# Patient Record
Sex: FEMALE | Race: WHITE | Employment: FULL TIME | URBAN - METROPOLITAN AREA
[De-identification: names, ages, dates, MRNs, and addresses within clinical notes are randomized per-mention and may not be internally consistent; named-entity substitution may affect disease eponyms.]

---

## 2017-01-20 ENCOUNTER — TELEPHONE (OUTPATIENT)
Dept: PRIMARY CARE CLINIC | Age: 47
End: 2017-01-20

## 2017-01-23 PROBLEM — Z51.11 ENCOUNTER FOR CHEMOTHERAPY MANAGEMENT: Status: ACTIVE | Noted: 2017-01-23

## 2017-03-14 PROBLEM — I89.0 LYMPHEDEMA OF BREAST: Status: ACTIVE | Noted: 2017-03-14

## 2017-03-23 ENCOUNTER — CARE COORDINATION (OUTPATIENT)
Dept: CARE COORDINATION | Age: 47
End: 2017-03-23

## 2017-03-29 ENCOUNTER — HOSPITAL ENCOUNTER (OUTPATIENT)
Dept: PHYSICAL THERAPY | Age: 47
Discharge: HOME | End: 2017-03-31
Attending: NURSE PRACTITIONER | Admitting: NURSE PRACTITIONER

## 2017-03-31 ENCOUNTER — OFFICE VISIT (OUTPATIENT)
Dept: SURGERY | Age: 47
End: 2017-03-31

## 2017-03-31 VITALS
WEIGHT: 244 LBS | SYSTOLIC BLOOD PRESSURE: 129 MMHG | DIASTOLIC BLOOD PRESSURE: 72 MMHG | BODY MASS INDEX: 39.21 KG/M2 | HEIGHT: 66 IN | HEART RATE: 60 BPM

## 2017-03-31 DIAGNOSIS — Z90.12 HISTORY OF PARTIAL MASTECTOMY, LEFT: ICD-10-CM

## 2017-03-31 DIAGNOSIS — Z92.3 HISTORY OF RADIATION THERAPY: ICD-10-CM

## 2017-03-31 DIAGNOSIS — Z08 ENCOUNTER FOR FOLLOW-UP SURVEILLANCE OF BREAST CANCER: ICD-10-CM

## 2017-03-31 DIAGNOSIS — Z85.3 ENCOUNTER FOR FOLLOW-UP SURVEILLANCE OF BREAST CANCER: ICD-10-CM

## 2017-03-31 DIAGNOSIS — Z92.21 HISTORY OF CHEMOTHERAPY: ICD-10-CM

## 2017-03-31 DIAGNOSIS — Z85.3 PERSONAL HISTORY OF BREAST CANCER: Primary | ICD-10-CM

## 2017-03-31 DIAGNOSIS — Z98.890 HISTORY OF BILATERAL BREAST REDUCTION SURGERY: ICD-10-CM

## 2017-03-31 PROCEDURE — 99213 OFFICE O/P EST LOW 20 MIN: CPT | Performed by: SURGERY

## 2017-04-28 ENCOUNTER — OFFICE VISIT (OUTPATIENT)
Dept: PRIMARY CARE CLINIC | Age: 47
End: 2017-04-28

## 2017-04-28 VITALS
SYSTOLIC BLOOD PRESSURE: 120 MMHG | WEIGHT: 247 LBS | BODY MASS INDEX: 39.7 KG/M2 | HEIGHT: 66 IN | DIASTOLIC BLOOD PRESSURE: 68 MMHG

## 2017-04-28 DIAGNOSIS — E55.9 VITAMIN D DEFICIENCY: ICD-10-CM

## 2017-04-28 DIAGNOSIS — Z00.00 ROUTINE PHYSICAL EXAMINATION: Primary | ICD-10-CM

## 2017-04-28 LAB
CHOLESTEROL, TOTAL: 267 MG/DL (ref 0–199)
HDLC SERPL-MCNC: 75 MG/DL (ref 40–60)
LDL CHOLESTEROL CALCULATED: 179 MG/DL
TRIGL SERPL-MCNC: 66 MG/DL (ref 0–150)
VITAMIN D 25-HYDROXY: 36.9 NG/ML
VLDLC SERPL CALC-MCNC: 13 MG/DL

## 2017-04-28 PROCEDURE — 99396 PREV VISIT EST AGE 40-64: CPT | Performed by: INTERNAL MEDICINE

## 2017-04-29 LAB
ESTIMATED AVERAGE GLUCOSE: 111.2 MG/DL
HBA1C MFR BLD: 5.5 %

## 2017-04-30 ENCOUNTER — TELEPHONE (OUTPATIENT)
Dept: PRIMARY CARE CLINIC | Age: 47
End: 2017-04-30

## 2017-05-01 ENCOUNTER — TELEPHONE (OUTPATIENT)
Dept: PRIMARY CARE CLINIC | Age: 47
End: 2017-05-01

## 2017-05-01 RX ORDER — ERGOCALCIFEROL (VITAMIN D2) 1250 MCG
CAPSULE ORAL
Qty: 24 CAPSULE | Refills: 3 | Status: SHIPPED | OUTPATIENT
Start: 2017-05-01 | End: 2018-05-31 | Stop reason: SDUPTHER

## 2017-05-01 RX ORDER — ATORVASTATIN CALCIUM 40 MG/1
40 TABLET, FILM COATED ORAL DAILY
Qty: 90 TABLET | Refills: 3 | Status: SHIPPED | OUTPATIENT
Start: 2017-05-01 | End: 2017-09-25

## 2017-06-22 ENCOUNTER — OFFICE VISIT (OUTPATIENT)
Dept: URGENT CARE | Age: 47
End: 2017-06-22

## 2017-06-22 VITALS
WEIGHT: 253 LBS | HEART RATE: 65 BPM | HEIGHT: 66 IN | TEMPERATURE: 96.7 F | BODY MASS INDEX: 40.66 KG/M2 | DIASTOLIC BLOOD PRESSURE: 80 MMHG | OXYGEN SATURATION: 98 % | SYSTOLIC BLOOD PRESSURE: 139 MMHG

## 2017-06-22 DIAGNOSIS — H93.8X3 EAR FULLNESS, BILATERAL: ICD-10-CM

## 2017-06-22 DIAGNOSIS — H65.191 ACUTE EFFUSION OF RIGHT EAR: ICD-10-CM

## 2017-06-22 DIAGNOSIS — H61.22 IMPACTED CERUMEN, LEFT EAR: Primary | ICD-10-CM

## 2017-06-22 PROCEDURE — 99203 OFFICE O/P NEW LOW 30 MIN: CPT | Performed by: EMERGENCY MEDICINE

## 2017-06-22 ASSESSMENT — ENCOUNTER SYMPTOMS
COUGH: 0
SHORTNESS OF BREATH: 0
EYE PAIN: 0
ABDOMINAL PAIN: 0
EYE DISCHARGE: 0
NAUSEA: 0
VOMITING: 0
SORE THROAT: 0
TROUBLE SWALLOWING: 0
RHINORRHEA: 0

## 2017-07-05 ENCOUNTER — OFFICE VISIT (OUTPATIENT)
Dept: SURGERY | Age: 47
End: 2017-07-05

## 2017-07-05 ENCOUNTER — HOSPITAL ENCOUNTER (OUTPATIENT)
Dept: MAMMOGRAPHY | Age: 47
Discharge: HOME | End: 2017-07-05
Attending: SURGERY | Admitting: SURGERY

## 2017-07-05 VITALS
HEART RATE: 59 BPM | HEIGHT: 66 IN | DIASTOLIC BLOOD PRESSURE: 74 MMHG | BODY MASS INDEX: 39.37 KG/M2 | SYSTOLIC BLOOD PRESSURE: 126 MMHG | WEIGHT: 245 LBS

## 2017-07-05 DIAGNOSIS — Z85.3 PERSONAL HISTORY OF BREAST CANCER: ICD-10-CM

## 2017-07-05 DIAGNOSIS — Z85.3 ENCOUNTER FOR FOLLOW-UP SURVEILLANCE OF BREAST CANCER: ICD-10-CM

## 2017-07-05 DIAGNOSIS — Z90.12 STATUS POST PARTIAL MASTECTOMY, LEFT: ICD-10-CM

## 2017-07-05 DIAGNOSIS — C50.212 MALIGNANT NEOPLASM OF UPPER-INNER QUADRANT OF LEFT FEMALE BREAST (HCC): Primary | ICD-10-CM

## 2017-07-05 DIAGNOSIS — Z92.21 HISTORY OF CHEMOTHERAPY: ICD-10-CM

## 2017-07-05 DIAGNOSIS — Z08 ENCOUNTER FOR FOLLOW-UP SURVEILLANCE OF BREAST CANCER: ICD-10-CM

## 2017-07-05 DIAGNOSIS — Z92.3 HISTORY OF RADIATION THERAPY: ICD-10-CM

## 2017-07-05 DIAGNOSIS — Z90.12 HISTORY OF PARTIAL MASTECTOMY, LEFT: ICD-10-CM

## 2017-07-05 DIAGNOSIS — C50.919 TRIPLE NEGATIVE MALIGNANT NEOPLASM OF BREAST (HCC): ICD-10-CM

## 2017-07-05 DIAGNOSIS — I89.0 LYMPHEDEMA OF BREAST: ICD-10-CM

## 2017-07-05 DIAGNOSIS — Z98.890 HISTORY OF BILATERAL BREAST REDUCTION SURGERY: ICD-10-CM

## 2017-07-05 PROCEDURE — 99213 OFFICE O/P EST LOW 20 MIN: CPT | Performed by: SURGERY

## 2017-07-31 ENCOUNTER — CARE COORDINATION (OUTPATIENT)
Dept: CARE COORDINATION | Age: 47
End: 2017-07-31

## 2017-08-03 ENCOUNTER — OFFICE VISIT (OUTPATIENT)
Dept: PRIMARY CARE CLINIC | Age: 47
End: 2017-08-03

## 2017-08-03 VITALS
BODY MASS INDEX: 40.53 KG/M2 | SYSTOLIC BLOOD PRESSURE: 118 MMHG | WEIGHT: 252.2 LBS | HEIGHT: 66 IN | DIASTOLIC BLOOD PRESSURE: 72 MMHG

## 2017-08-03 DIAGNOSIS — E78.00 HYPERCHOLESTEROLEMIA: Primary | ICD-10-CM

## 2017-08-03 DIAGNOSIS — T73.2XXA FATIGUE DUE TO EXPOSURE, INITIAL ENCOUNTER: ICD-10-CM

## 2017-08-03 DIAGNOSIS — M79.10 MUSCLE PAIN: ICD-10-CM

## 2017-08-03 PROCEDURE — 99214 OFFICE O/P EST MOD 30 MIN: CPT | Performed by: INTERNAL MEDICINE

## 2017-08-04 LAB
CHOLESTEROL, TOTAL: 203 MG/DL (ref 0–199)
HDLC SERPL-MCNC: 59 MG/DL (ref 40–60)
LDL CHOLESTEROL CALCULATED: 126 MG/DL
TOTAL CK: 77 U/L (ref 26–192)
TRIGL SERPL-MCNC: 92 MG/DL (ref 0–150)
TSH SERPL DL<=0.05 MIU/L-ACNC: 3.59 UIU/ML (ref 0.27–4.2)
VLDLC SERPL CALC-MCNC: 18 MG/DL

## 2017-09-25 ENCOUNTER — HOSPITAL ENCOUNTER (OUTPATIENT)
Dept: GENERAL RADIOLOGY | Facility: MEDICAL CENTER | Age: 47
Discharge: HOME | End: 2017-09-25
Attending: INTERNAL MEDICINE | Admitting: INTERNAL MEDICINE

## 2017-09-25 ENCOUNTER — OFFICE VISIT (OUTPATIENT)
Dept: PRIMARY CARE CLINIC | Age: 47
End: 2017-09-25

## 2017-09-25 VITALS
BODY MASS INDEX: 40.82 KG/M2 | WEIGHT: 254 LBS | DIASTOLIC BLOOD PRESSURE: 68 MMHG | HEIGHT: 66 IN | SYSTOLIC BLOOD PRESSURE: 118 MMHG

## 2017-09-25 DIAGNOSIS — M25.561 CHRONIC PAIN OF BOTH KNEES: Primary | ICD-10-CM

## 2017-09-25 DIAGNOSIS — M25.562 CHRONIC PAIN OF BOTH KNEES: ICD-10-CM

## 2017-09-25 DIAGNOSIS — G89.29 CHRONIC PAIN OF BOTH KNEES: ICD-10-CM

## 2017-09-25 DIAGNOSIS — G89.29 CHRONIC PAIN OF BOTH KNEES: Primary | ICD-10-CM

## 2017-09-25 DIAGNOSIS — Z23 FLU VACCINE NEED: ICD-10-CM

## 2017-09-25 DIAGNOSIS — M25.562 CHRONIC PAIN OF BOTH KNEES: Primary | ICD-10-CM

## 2017-09-25 DIAGNOSIS — M25.561 CHRONIC PAIN OF BOTH KNEES: ICD-10-CM

## 2017-09-25 PROCEDURE — 99214 OFFICE O/P EST MOD 30 MIN: CPT | Performed by: INTERNAL MEDICINE

## 2017-09-25 PROCEDURE — 90471 IMMUNIZATION ADMIN: CPT | Performed by: INTERNAL MEDICINE

## 2017-09-25 PROCEDURE — 90686 IIV4 VACC NO PRSV 0.5 ML IM: CPT | Performed by: INTERNAL MEDICINE

## 2017-09-26 PROBLEM — M17.0 ARTHRITIS OF BOTH KNEES: Status: ACTIVE | Noted: 2017-09-26

## 2017-10-23 ENCOUNTER — OFFICE VISIT (OUTPATIENT)
Dept: URGENT CARE | Age: 47
End: 2017-10-23

## 2017-10-23 VITALS
SYSTOLIC BLOOD PRESSURE: 122 MMHG | DIASTOLIC BLOOD PRESSURE: 67 MMHG | OXYGEN SATURATION: 97 % | WEIGHT: 257.6 LBS | HEART RATE: 67 BPM | RESPIRATION RATE: 14 BRPM | HEIGHT: 66 IN | TEMPERATURE: 97.7 F | BODY MASS INDEX: 41.4 KG/M2

## 2017-10-23 DIAGNOSIS — R10.32 ABDOMINAL PAIN, LLQ: Primary | ICD-10-CM

## 2017-10-23 PROCEDURE — 99212 OFFICE O/P EST SF 10 MIN: CPT | Performed by: PHYSICIAN ASSISTANT

## 2017-10-24 ENCOUNTER — OFFICE VISIT (OUTPATIENT)
Dept: PRIMARY CARE CLINIC | Age: 47
End: 2017-10-24

## 2017-10-24 ENCOUNTER — HOSPITAL ENCOUNTER (OUTPATIENT)
Dept: GENERAL RADIOLOGY | Facility: MEDICAL CENTER | Age: 47
Discharge: HOME | End: 2017-10-24
Attending: INTERNAL MEDICINE | Admitting: INTERNAL MEDICINE

## 2017-10-24 VITALS
SYSTOLIC BLOOD PRESSURE: 112 MMHG | BODY MASS INDEX: 41.3 KG/M2 | HEIGHT: 66 IN | WEIGHT: 257 LBS | DIASTOLIC BLOOD PRESSURE: 78 MMHG | TEMPERATURE: 98.4 F

## 2017-10-24 DIAGNOSIS — R10.32 LLQ PAIN: ICD-10-CM

## 2017-10-24 DIAGNOSIS — R10.32 LLQ PAIN: Primary | ICD-10-CM

## 2017-10-24 PROCEDURE — 99214 OFFICE O/P EST MOD 30 MIN: CPT | Performed by: INTERNAL MEDICINE

## 2017-10-24 ASSESSMENT — ENCOUNTER SYMPTOMS
NAUSEA: 0
BACK PAIN: 0
CONSTIPATION: 0
DIARRHEA: 0
BLOOD IN STOOL: 0
VOMITING: 0
ABDOMINAL PAIN: 1

## 2017-10-24 NOTE — PROGRESS NOTES
Allergies   Allergen Reactions    Adhesive Tape      Irritates skin    Percocet [Oxycodone-Acetaminophen] Itching and Nausea And Vomiting    Prozac [Fluoxetine] Nausea And Vomiting       Review of Systems     ROS: Please refer to HPI for any pertinent positive findings, as all other systems were reviewed as negative unless otherwise listed above. Review of Systems   Constitutional: Negative for chills, diaphoresis, fever, malaise/fatigue and weight loss. Gastrointestinal: Positive for abdominal pain. Negative for blood in stool, constipation, diarrhea, nausea and vomiting. Genitourinary: Negative for dysuria, flank pain, frequency, hematuria and urgency. Musculoskeletal: Negative for back pain, falls, joint pain and myalgias. Skin: Negative for rash. Physical Exam     Vitals:    10/23/17 1234   BP: 122/67   Pulse: 67   Resp: 14   Temp: 97.2531118802170 °F (36.5 °C)   SpO2: 97%       Constitutional:  Well developed, well nourished, no acute distress, non-toxic appearance   Eyes:  PERRL, conjunctiva normal, no ciliary injection, evidence of foreign body, or discharge. HENT:  Head is normocephalic, atraumatic; external ears normal, external nose and nasal mucosa normal, oropharynx pink and moist, no pharyngeal exudates. Neck- Supple, passive and active range of motion in tact, no tenderness upon palpation along spine. No LAD  or neck masses appreciated. Respiratory:  No respiratory distress, normal breath sounds bilaterally, no rales, no wheezing. Cardiovascular:  Normal rate, normal rhythm, no murmurs, no gallops, no rubs   GI:  Some tenderness to palpation at LLQ. Abdomen otherwise soft, nondistended, normal bowel sounds, no organomegaly, no mass, no rebound, no guarding. : No pain with palpation at lower back, no flank pain with bilateral CVA bump. No bladder pressure with palpation. Musculoskeletal:  No pain upon palpation along spine or musculature.  No edema, no tenderness, no deformities. Integument:  Well hydrated, no lesions, cyanosis, or rashes appreciated. Lymphatic:  No lymphadenopathy noted   Neurologic:  Alert & oriented x 3, CN 2-12 in tact bilaterally, normal motor function and sensation in tact, no focal deficits noted   Psychiatric:  Speech and behavior appropriate. Appropriate mood and affect. Physical Exam    Procedure:   none    Assessment:    1. Abdominal pain, LLQ        Plan:    - We have discussed cause of sxs are not likely d/t UTI, diverticulosis, or muscle strain. Consider possible ovarian cysts, hormonal changes, or perimenopause/menopausal sxs (early onset d/t chemotherapy for breast cancer). We have discussed sxs of diverticulitis, ovarian torsion and other emergencies and when to go to the ER. Otherwise, encouraged patient to call oncologist to notify her of changes and follow up with her (or PCP) in the next 1-2 weeks to see if this is an expected change given her chemoradiation treatment, age, and hx of cystic ovaries. No orders of the defined types were placed in this encounter.

## 2017-10-24 NOTE — PROGRESS NOTES
auscultation bilaterally  Heart: regular rate and rhythm, S1, S2 normal, no murmur, click, rub or gallop  Abdomen: soft. No masses. Normal BS. LLQ tenderness  Neurologic: Mental status: Alert, oriented, thought content appropriate  Cranial nerves: normal  Skin: Skin is warm and dry. Asad Basket Psychiatric: normal mood and affect. speech is normal and behavior is normal. Judgment, cognition and memory are normal.     not applicable    Assessment and Plan  1.  LLQ pain  Patient already with neuropathy from recent chemo-would be concerned about diverticulitis, but has not had in past and want to avoid flagyl unless absolutely necessary with preexisting neuropathy  Discussed ovarian problem would also likely be visible on CT    - CT ABDOMEN PELVIS W WO IV CONTRAST Additional Contrast? Oral

## 2017-10-31 ENCOUNTER — TELEPHONE (OUTPATIENT)
Dept: BARIATRICS/WEIGHT MGMT | Age: 47
End: 2017-10-31

## 2017-10-31 NOTE — TELEPHONE ENCOUNTER
Called as a courtesy call - reminder - had to leave a voicemail. Reminded patient to arrive 30 min before appt time with their   complete new patient paperwork, insurance card, and ID. If they had questions left our   office number to call. For some reason if they didn't receive paperwork   let us know and we could email or they could print off website. Let them   know if this wasn't complete and if didn't arrive on time they may be rescheduled.

## 2017-11-14 ENCOUNTER — OFFICE VISIT (OUTPATIENT)
Dept: BARIATRICS/WEIGHT MGMT | Age: 47
End: 2017-11-14

## 2017-11-14 VITALS
SYSTOLIC BLOOD PRESSURE: 118 MMHG | WEIGHT: 258.4 LBS | DIASTOLIC BLOOD PRESSURE: 88 MMHG | RESPIRATION RATE: 16 BRPM | HEIGHT: 66 IN | HEART RATE: 88 BPM | BODY MASS INDEX: 41.53 KG/M2

## 2017-11-14 DIAGNOSIS — C50.919 TRIPLE NEGATIVE MALIGNANT NEOPLASM OF BREAST (HCC): ICD-10-CM

## 2017-11-14 DIAGNOSIS — K21.9 CHRONIC GERD: ICD-10-CM

## 2017-11-14 DIAGNOSIS — E78.00 HYPERCHOLESTEROLEMIA: ICD-10-CM

## 2017-11-14 DIAGNOSIS — Z01.818 PRE-OPERATIVE CLEARANCE: ICD-10-CM

## 2017-11-14 PROBLEM — E78.2 MIXED HYPERLIPIDEMIA: Status: ACTIVE | Noted: 2017-11-14

## 2017-11-14 PROCEDURE — 99205 OFFICE O/P NEW HI 60 MIN: CPT | Performed by: SURGERY

## 2017-11-14 NOTE — PROGRESS NOTES
patient to feel comfortably full. Most days the patient eats until she is stuffed. Patient describes level of activity as mild - water aerobic 2X/week. Surgery  Patient's greatest concern about having surgery is: surgery itself. Patient describes the motivation for weight loss surgery to be: Wants long term weight control, generally feel better, reduce risk or reoccurrence of breast cancer. Patient does feel confident in her ability to make these changes. The patient's expectations of post-surgical eating habits realistic. Patient states she does understand the consequences of not complying with post-op food guidelines. Patient states she understands the long term changes in food intake that will be necessary for all occasions after surgery for the rest of her life. she does understand the long term food changes. Patient is deemed nutritionally appropriate to proceed. Goals  Weight: 160 lbs  Health Improvement: lower cholesterol, prevent heart disease    Assessment  Nutritional Needs: RMR=(9.99 x 117.2) + (6.25 x 167.6) - (4.92 x 47 y.o.) -161  = 1817 kcal x 1.4 (sedentary activity factor)= 2544 kcal - 1000 (for 2 lb weight loss/week)= 1544 kcal.    Plan  Surgical procedure desired: Sleeve    Plan/Recommendations: General weight loss/lifestyle modification strategies discussed (elicit support from others; identify saboteurs; non-food rewards, etc). Informal exercise measures discussed, e.g. taking stairs instead of elevator. Goals:   -Eat 4-5 times daily  -Avoid high fat and high sugar foods  -Include protein with all meals and snacks  -Avoid carbonation and caffeine  -Avoid calorie containing beverages  -Increase physical activity as tolerated    PES Statement:  Overweight/Obesity related to lack of exercise, sedentary lifestyle, unhealthy eating habits, and unsuccessful diet attempts as evidenced by BMI. Body mass index is 41.71 kg/m². .    Will follow up as necessary.     [employer] Jake

## 2017-11-14 NOTE — PATIENT INSTRUCTIONS
Patient received dietary handouts and education. Labs ordered. Psych Evaluation. EGD  Support Group. PCP Letter. Quit Smoking and Alcohol   Weight History 2 yrs. F/U in 4 weeks           Patient advised that its their responsibility to follow up for studies, referrals and/or labs ordered today.

## 2017-11-15 LAB
A/G RATIO: 1.6 (ref 1.1–2.2)
ALBUMIN SERPL-MCNC: 4.3 G/DL (ref 3.4–5)
ALP BLD-CCNC: 114 U/L (ref 40–129)
ALT SERPL-CCNC: 21 U/L (ref 10–40)
ANION GAP SERPL CALCULATED.3IONS-SCNC: 13 MMOL/L (ref 3–16)
AST SERPL-CCNC: 19 U/L (ref 15–37)
BASOPHILS ABSOLUTE: 0 K/UL (ref 0–0.2)
BASOPHILS RELATIVE PERCENT: 1.1 %
BILIRUB SERPL-MCNC: 0.4 MG/DL (ref 0–1)
BUN BLDV-MCNC: 18 MG/DL (ref 7–20)
CALCIUM SERPL-MCNC: 9.7 MG/DL (ref 8.3–10.6)
CHLORIDE BLD-SCNC: 101 MMOL/L (ref 99–110)
CHOLESTEROL, TOTAL: 281 MG/DL (ref 0–199)
CO2: 30 MMOL/L (ref 21–32)
CREAT SERPL-MCNC: 0.6 MG/DL (ref 0.6–1.1)
EOSINOPHILS ABSOLUTE: 0.1 K/UL (ref 0–0.6)
EOSINOPHILS RELATIVE PERCENT: 1.5 %
FOLATE: 16.77 NG/ML (ref 4.78–24.2)
GFR AFRICAN AMERICAN: >60
GFR NON-AFRICAN AMERICAN: >60
GLOBULIN: 2.7 G/DL
GLUCOSE BLD-MCNC: 79 MG/DL (ref 70–99)
HCT VFR BLD CALC: 38.5 % (ref 36–48)
HDLC SERPL-MCNC: 67 MG/DL (ref 40–60)
HEMOGLOBIN: 13.1 G/DL (ref 12–16)
IRON SATURATION: 26 % (ref 15–50)
IRON: 82 UG/DL (ref 37–145)
LDL CHOLESTEROL CALCULATED: 192 MG/DL
LYMPHOCYTES ABSOLUTE: 0.8 K/UL (ref 1–5.1)
LYMPHOCYTES RELATIVE PERCENT: 21.8 %
MCH RBC QN AUTO: 32.1 PG (ref 26–34)
MCHC RBC AUTO-ENTMCNC: 34.1 G/DL (ref 31–36)
MCV RBC AUTO: 93.9 FL (ref 80–100)
MONOCYTES ABSOLUTE: 0.3 K/UL (ref 0–1.3)
MONOCYTES RELATIVE PERCENT: 8.3 %
NEUTROPHILS ABSOLUTE: 2.5 K/UL (ref 1.7–7.7)
NEUTROPHILS RELATIVE PERCENT: 67.3 %
PDW BLD-RTO: 13 % (ref 12.4–15.4)
PLATELET # BLD: 181 K/UL (ref 135–450)
PMV BLD AUTO: 9.2 FL (ref 5–10.5)
POTASSIUM SERPL-SCNC: 4.1 MMOL/L (ref 3.5–5.1)
RBC # BLD: 4.09 M/UL (ref 4–5.2)
SODIUM BLD-SCNC: 144 MMOL/L (ref 136–145)
TOTAL IRON BINDING CAPACITY: 310 UG/DL (ref 260–445)
TOTAL PROTEIN: 7 G/DL (ref 6.4–8.2)
TRIGL SERPL-MCNC: 111 MG/DL (ref 0–150)
TSH REFLEX: 3.31 UIU/ML (ref 0.27–4.2)
VITAMIN B-12: 453 PG/ML (ref 211–911)
VITAMIN D 25-HYDROXY: 45.9 NG/ML
VLDLC SERPL CALC-MCNC: 22 MG/DL
WBC # BLD: 3.7 K/UL (ref 4–11)

## 2017-11-16 LAB
ESTIMATED AVERAGE GLUCOSE: 108.3 MG/DL
HBA1C MFR BLD: 5.4 %

## 2017-11-18 NOTE — PROGRESS NOTES
Procedure Laterality Date    BREAST BIOPSY      CHOLECYSTECTOMY  1994    HYSTERECTOMY  2004    HYSTERECTOMY, VAGINAL      LASIK      OTHER SURGICAL HISTORY Right 05/12/2016    right subclavian port-a-cath    ROTATOR CUFF REPAIR Right 2012     Family History   Problem Relation Age of Onset    Heart Disease Mother     Arthritis Mother     Asthma Mother     Coronary Art Dis Mother     Heart Disease Father     Arthritis Father     Diabetes Father     Coronary Art Dis Father     Depression Father     Hypertension Father     Cancer Sister      Social History   Substance Use Topics    Smoking status: Never Smoker    Smokeless tobacco: Never Used    Alcohol use No      Comment: rarely     I counseled the patient on the importance of not smoking and risks of ETOH. Allergies   Allergen Reactions    Adhesive Tape      Irritates skin    Percocet [Oxycodone-Acetaminophen] Itching and Nausea And Vomiting    Prozac [Fluoxetine] Nausea And Vomiting     Vitals:    11/14/17 1508   BP: 118/88   Pulse: 88   Resp: 16   Weight: 258 lb 6.4 oz (117.2 kg)   Height: 5' 6\" (1.676 m)       Body mass index is 41.71 kg/m².       Current Outpatient Prescriptions:     ergocalciferol (DRISDOL) 42635 UNITS capsule, 1 cap po twice weekly, Disp: 24 capsule, Rfl: 3    melatonin 3 MG TABS tablet, Take 10 mg by mouth daily, Disp: , Rfl:     citalopram (CELEXA) 20 MG tablet, 1 tab po daily for anxiety, Disp: 90 tablet, Rfl: 1    B Complex Vitamins (VITAMIN B COMPLEX PO), Take by mouth, Disp: , Rfl:     ranitidine (ZANTAC) 300 MG tablet, Take 300 mg by mouth nightly, Disp: , Rfl:     fluticasone (FLONASE) 50 MCG/ACT nasal spray, 2 sprays each nostril daily, Disp: 3 Bottle, Rfl: 3    diphenhydrAMINE (BENADRYL) 25 MG capsule, Take 25 mg by mouth every 6 hours as needed for Itching, Disp: , Rfl:     SUMAtriptan (IMITREX) 100 MG tablet, Take 1 tablet by mouth once as needed for Migraine, Disp: 27 tablet, Rfl: 1    nystatin no rales. Patient exhibits no tenderness and no crepitus. Abdominal: Soft. Normal appearance and bowel sounds are normal. Patient exhibits no distension, no abdominal bruit, no ascites and no mass. There is no hepatosplenomegaly. There is no tenderness. There is no rigidity, no rebound, no guarding and no CVA tenderness. No hernia. Hernia confirmed negative in the ventral area. Musculoskeletal: Normal range of motion. Patient exhibits no edema or tenderness. Lymphadenopathy:        Head (right side): No submental, no submandibular, no preauricular, no posterior auricular and no occipital adenopathy present. Head (left side): No submental, no submandibular, no preauricular, no posterior auricular and no occipital adenopathy present. Patient  has no cervical adenopathy. Right: No supraclavicular adenopathy present. Left: No supraclavicular adenopathy present. Neurological: Patient is alert and oriented to person, place, and time. Patient has normal strength. Coordination and gait normal. GCS eye subscore is 4. GCS verbal subscore is 5. GCS motor subscore is 6. Skin: Skin is warm and dry. No abrasion and no rash noted. Patient  is not diaphoretic. No cyanosis or erythema. Psychiatric: Patient has a normal mood and affect. speech is normal and behavior is normal. Cognition and memory are normal.         Nguyen was seen today for bariatric, initial visit. Diagnoses and all orders for this visit:    Obesity, Class II, BMI 35-39.9, with comorbidity  -     CBC with Differential; Future  -     Lipid Panel; Future  -     Comprehensive Metabolic Panel; Future  -     Iron and TIBC; Future  -     TSH with Reflex; Future  -     Vitamin B12 & Folate; Future  -     Vitamin D 25 Hydroxy; Future  -     Hemoglobin A1C; Future    Pre-operative clearance  -     CBC with Differential; Future  -     Lipid Panel; Future  -     Comprehensive Metabolic Panel; Future  -     Iron and TIBC;  Future  - TSH with Reflex; Future  -     Vitamin B12 & Folate; Future  -     Vitamin D 25 Hydroxy; Future  -     Hemoglobin A1C; Future    Triple negative malignant neoplasm of breast (HCC)  -     CBC with Differential; Future  -     Lipid Panel; Future  -     Comprehensive Metabolic Panel; Future  -     Iron and TIBC; Future  -     TSH with Reflex; Future  -     Vitamin B12 & Folate; Future  -     Vitamin D 25 Hydroxy; Future  -     Hemoglobin A1C; Future    Hypercholesterolemia-ASCVD 1.1%  3/2016  -     CBC with Differential; Future  -     Lipid Panel; Future  -     Comprehensive Metabolic Panel; Future  -     Iron and TIBC; Future  -     TSH with Reflex; Future  -     Vitamin B12 & Folate; Future  -     Vitamin D 25 Hydroxy; Future  -     Hemoglobin A1C; Future    Chronic GERD  -     CBC with Differential; Future  -     Lipid Panel; Future  -     Comprehensive Metabolic Panel; Future  -     Iron and TIBC; Future  -     TSH with Reflex; Future  -     Vitamin B12 & Folate; Future  -     Vitamin D 25 Hydroxy; Future  -     Hemoglobin A1C; Future    Other orders  -     CBC Auto Differential  -     Lipid Panel  -     Iron and TIBC  -     Comprehensive Metabolic Panel  -     TSH with Reflex  -     Vitamin D 25 Hydroxy  -     Vitamin B12 & Folate  -     Hemoglobin A1C          A/P  Arianna Carroll is a very pleasant 50 y.o. female with Obesity,  Body mass index is 41.71 kg/m². and multiple obesity related co-morbidities. Arianna Carroll is very motivated to lose weight and being more healthy.    We discussed how her weight affects her overall health including:  Patient Active Problem List   Diagnosis    Allergic rhinitis    Chronic migraine    Hypercholesterolemia-ASCVD 1.1%  3/2016    Malignant neoplasm of upper-inner quadrant of left female breast ([address]    Triple negative malignant neoplasm of breast ([address]    Neoplastic malignant related fatigue    Chemotherapy-induced neuropathy ([address]    Malignant [facility] of left female [facility]    Symptomatic mammary hypertrophy    Encounter for chemotherapy management    Lymphedema of breast    Obesity, Class II, BMI 35-39.9, with comorbidity    Arthritis of both knees-xray 2017    Pre-operative clearance    Chronic GERD      The patient underwent 30 minutes of dietary counseling. I have reviewed, discussed and agree with the dietary plan. Medical weight loss and different surgical options were discussed in details with patient. Rommel Avery is interested in surgical weight loss. Patient is interested in Laparoscopic Sleeve Gastrectomy , which I believe is an excellent option. We will proceed with pre-operative work up labs and studies. Will also petition patient's  insurance for approval for this procedure. I advised the patient that we can't guarantee final insurance approval.    Patient received dietary handouts and education. Patient advised that its their responsibility to follow up for studies, referrals and/or labs ordered today. Also discussed in details the importance of follow up, as well following the recommendations and completing the whole program to improve outcomes when it comes to healthier lifestyle as well weight loss. Patient also advised about risks and benefits being on a strict dietary regimen as well using supplements. Patient agrees and wants to proceed with weight loss planning       Patient Instructions   Patient received dietary handouts and education. Labs ordered. Psych Evaluation. EGD  Support Group. PCP Letter. Quit Smoking and Alcohol   Weight History 2 yrs. F/U in 4 weeks           Patient advised that its their responsibility to follow up for studies, referrals and/or labs ordered today.

## 2017-11-20 ENCOUNTER — TELEPHONE (OUTPATIENT)
Dept: BARIATRICS/WEIGHT MGMT | Age: 47
End: 2017-11-20

## 2017-11-20 DIAGNOSIS — E78.2 MIXED HYPERLIPIDEMIA: Primary | ICD-10-CM

## 2017-11-20 RX ORDER — CITALOPRAM 20 MG/1
TABLET ORAL
Qty: 90 TABLET | Refills: 1 | Status: SHIPPED | OUTPATIENT
Start: 2017-11-20 | End: 2018-11-12 | Stop reason: SDUPTHER

## 2017-11-20 NOTE — TELEPHONE ENCOUNTER
Left message for patient in reference to reviewing her  lab results. If patient calls back please verify the best number to reach her at and if they approve for me to leave a detailed voicemail if I am unable to reach them. Thanks. I will also send a [employer] message to the patient informing them of their results.

## 2017-12-21 ENCOUNTER — OFFICE VISIT (OUTPATIENT)
Dept: BARIATRICS/WEIGHT MGMT | Age: 47
End: 2017-12-21

## 2017-12-21 VITALS
SYSTOLIC BLOOD PRESSURE: 136 MMHG | WEIGHT: 261 LBS | RESPIRATION RATE: 16 BRPM | HEART RATE: 90 BPM | HEIGHT: 66 IN | DIASTOLIC BLOOD PRESSURE: 84 MMHG | BODY MASS INDEX: 41.95 KG/M2

## 2017-12-21 DIAGNOSIS — K21.9 CHRONIC GERD: ICD-10-CM

## 2017-12-21 DIAGNOSIS — E78.2 MIXED HYPERLIPIDEMIA: ICD-10-CM

## 2017-12-21 DIAGNOSIS — E66.01 MORBID OBESITY WITH BMI OF 40.0-44.9, ADULT (HCC): Primary | ICD-10-CM

## 2017-12-21 PROCEDURE — 99213 OFFICE O/P EST LOW 20 MIN: CPT | Performed by: NURSE PRACTITIONER

## 2017-12-21 ASSESSMENT — ENCOUNTER SYMPTOMS
RESPIRATORY NEGATIVE: 1
EYES NEGATIVE: 1
GASTROINTESTINAL NEGATIVE: 1

## 2017-12-21 NOTE — PROGRESS NOTES
Boo Núñez gained 2.6 lbs over past month. States she has moved to smaller plate and increased activity. Was traveling so it was a little tougher but tried to make some changes. Breakfast: oatmeal w/ dried fruit (mixed w/ water) & pc of fruit (banana OR apple OR grapes)    Snack: 4-6 wheat thin (sometimes w/ PB) OR 4-6 triscuit OR handful of trailmix (nuts & dried fruit)    Lunch: salad: veggies w/ grilled chicken OR peppers, sunflower seeds & olive oil dressing    Snack: none    Dinner: protein (grilled or baked chicken) & veggies (sweet potatoes OR broccoli) & spinach salad w/ tomatoes    Snack: sometimes - fruit OR pudding cup OR 1 single serve popcorn    Is pt consuming smaller portions? yes using a smaller plate    Is pt consuming at least 64 oz of fluids per day? no still working on it - close to 32-40 oz (plain water & ice tea OR water w/ crystal light)    Is pt consuming carbonated, caffeinated, or sugary beverages? yes tea    Has pt sampled Unjury and/or Nectar protein? Provided samples    Exercise: walking - averaging ~1 mile 4 days per wk & started water aerobics 2x/wk but hasn't done recently    Plan/Recommendations:   1. Focus on protein q meal/snack  2. Eliminate caffeine  3. Increase fluid - goal 48-64 oz  4.   Try protein powder    Handouts: none    Hitesh Atkinson

## 2017-12-21 NOTE — PROGRESS NOTES
[facility]) Phys[facility]   [facility]    Subjective:      Patient ID: Giovanny Prather is a 50 y.o. female    HPI    Presurgery    Giovanny Prather is a very pleasant 50 y.o. female with Body mass index is 42.13 kg/m². Nadia Garza Past Medical History:   Diagnosis Date    Allergic rhinitis     Anxiety 7/21/2015    Arthritis of both knees-xray 2017 9/26/2017    Cancer ([address]     breast cancer    Chronic GERD 11/14/2017    Depression     pt states \"situational anxiety\"    Gestational diabetes     History of blood transfusion     Hypercholesterolemia 3/14/2016    no meds    Migraine     Morbid obesity with BMI of 40.0-44.9, adult ([address] 4/21/2014    Prediabetes 3/14/2016    Sore gums     Trochanteric bursitis of right hip 4/21/2014     Past Surgical History:   Procedure Laterality Date    BREAST BIOPSY      CHOLECYSTECTOMY  1994    HYSTERECTOMY  2004    HYSTERECTOMY, VAGINAL      LASIK      OTHER SURGICAL HISTORY Right 05/12/2016    right subclavian port-a-cath    ROTATOR CUFF REPAIR Right 2012     Family History   Problem Relation Age of Onset    Heart Disease Mother     Arthritis Mother     Asthma Mother     Coronary Art Dis Mother     Heart Disease Father     Arthritis Father     Diabetes Father     Coronary Art Dis Father     Depression Father     Hypertension Father     Cancer Sister      Social History   Substance Use Topics    Smoking status: Never Smoker    Smokeless tobacco: Never Used    Alcohol use No      Comment: rarely     I counseled the patient on the importance of not smoking and risks of ETOH. Allergies   Allergen Reactions    Adhesive Tape      Irritates skin    Percocet [Oxycodone-Acetaminophen] Itching and Nausea And Vomiting    Prozac [Fluoxetine] Nausea And Vomiting     Vitals:    12/21/17 1445   BP: 136/84   Pulse: 90   Resp: 16   Weight: 261 lb (118.4 kg)   Height: 5' 6\" (1.676 m)        Body mass index is 42.13 kg/m².       Current Outpatient Prescriptions:     citalopram (CELEXA) 20 MG tablet, TAKE ONE TABLET BY MOUTH ONE TIME A DAY FOR ANXIETY, Disp: 90 tablet, Rfl: 1    ergocalciferol (DRISDOL) 33965 UNITS capsule, 1 cap po twice weekly, Disp: 24 capsule, Rfl: 3    melatonin 3 MG TABS tablet, Take 10 mg by mouth daily, Disp: , Rfl:     diphenhydrAMINE (BENADRYL) 25 MG capsule, Take 25 mg by mouth every 6 hours as needed for Itching, Disp: , Rfl:     B Complex Vitamins (VITAMIN B COMPLEX PO), Take by mouth, Disp: , Rfl:     ranitidine (ZANTAC) 300 MG tablet, Take 300 mg by mouth nightly, Disp: , Rfl:     fluticasone (FLONASE) 50 MCG/ACT nasal spray, 2 sprays each nostril daily, Disp: 3 Bottle, Rfl: 3    nystatin 181198 UNIT/GM POWD, APPLY AS DIRECTED TWICE DAILY, Disp: 15 g, Rfl: 1    SUMAtriptan (IMITREX) 100 MG tablet, Take 1 tablet by mouth once as needed for Migraine, Disp: 27 tablet, Rfl: 1      Review of Systems   Constitutional: Negative. HENT: Negative. Eyes: Negative. Respiratory: Negative. Cardiovascular: Negative. Gastrointestinal: Negative. Skin: Negative. Neurological: Negative. Objective:   Physical Exam   Constitutional: She is oriented to person, place, and time. She appears well-developed and well-nourished. HENT:   Head: Normocephalic and atraumatic. Eyes: Pupils are equal, round, and reactive to light. Neck: Normal range of motion. Cardiovascular: Normal rate. Pulmonary/Chest: Effort normal.   Musculoskeletal: Normal range of motion. Neurological: She is alert and oriented to person, place, and time. Psychiatric: She has a normal mood and affect. Her behavior is normal. Judgment and thought content normal.         Assessment and Plan:       Patient is here for their 2nd presurgery visit for sleeve, gained 2.6 lbs. The patient's current Body mass index is 42.13 kg/m². (12/21/17). She  is making dietary and behavior modifications.  She has been monitoring her portions and choosing

## 2017-12-21 NOTE — PATIENT INSTRUCTIONS
Patient received dietary handouts and education. Goals in preparing for bariatric surgery    You should be giving up all beverages that have carbonation, sugar, and caffeine. (Refer to the approved liquids list provided at initial visit)   You should be drinking 64 ounces of calorie free fluids per day. Suggestions include:  o Water (you may add fresh lemon or lime)  o Crystal Light  o Orwell Liquid Water Enhancer  o Propel Zero  o Powerade Zero  o Isopure  o [id number]  o SOBE Lifewater Zero  o Vitamin Water Zero  o Sugar Free Pablo-Aid      You should be eating 4-6 times per day.  Three small meals plus 1-2 snacks per day is your goal. This balances your calories and nutrients evenly throughout the day and helps to boost your metabolism. Snacking is a good thing if you are choosing healthful options. Refer to the snack list provided at your initial visit. Aim for a protein at every snack, plus a fruit, vegetable or starch. You should be eating protein at every meal and snack.  Protein is typically found in animal sources, i.e. chicken, beef, pork, fish and seafood, eggs. It is also found in low-fat dairy sources such as skim or 1% milk, low-fat yogurt, low-fat cheese, and low-fat cottage cheese. Plant based sources of protein include peanut butter, beans, and soy. You should be utilizing the 9-inch plate method.  Eating on a smaller plate will help you control portion size. But what you put on your plate counts also. Make ¼ of your plate protein, ¼ starch and ½ the plate non-starchy vegetables. You should be eliminating caffeine.  Caffeine is dehydrating. After surgery, its very important to stay hydrated. Giving up caffeine before surgery will help you focus on the changes necessary to be successful after surgery. There are many decaffeinated coffee and tea products available in grocery stores. You should be reducing added fat and sugar in your diet.    Frying foods adds too much fat and calories. Baking, broiling, or grilling meats add flavor without unhealthy fats. Using cooking oil spray and spray butter products are also healthful changes that will aid in your weight loss. Foods high in sugar are often also high in calories and low in nutrients. Eating habits after surgery will have to be a permanent and long-term change. Eating habits are so ingrained that it can be difficult to change. It is important to practice new eating habits prior to surgery to mentally prepare yourself for the challenge ahead. Also remember that overall health, age, and genetics make each persons weight loss progress different. Do not compare your progress (pre or post-operatively), the amount you eat, or exercise to other patients. In addition, it is the responsibility of the patient to schedule and follow up on labs and tests completed during the pre-operative period. Results will be reviewed at each visit.

## 2017-12-29 ENCOUNTER — OFFICE VISIT (OUTPATIENT)
Dept: URGENT CARE | Age: 47
End: 2017-12-29

## 2017-12-29 VITALS
OXYGEN SATURATION: 100 % | SYSTOLIC BLOOD PRESSURE: 128 MMHG | BODY MASS INDEX: 41.46 KG/M2 | HEART RATE: 75 BPM | WEIGHT: 258 LBS | HEIGHT: 66 IN | DIASTOLIC BLOOD PRESSURE: 84 MMHG

## 2017-12-29 DIAGNOSIS — J06.9 ACUTE URI: Primary | ICD-10-CM

## 2017-12-29 DIAGNOSIS — R09.81 SINUS CONGESTION: ICD-10-CM

## 2017-12-29 PROCEDURE — 99214 OFFICE O/P EST MOD 30 MIN: CPT | Performed by: EMERGENCY MEDICINE

## 2017-12-29 RX ORDER — FLUTICASONE PROPIONATE 50 MCG
1 SPRAY, SUSPENSION (ML) NASAL DAILY
Qty: 1 BOTTLE | Refills: 0 | Status: SHIPPED | OUTPATIENT
Start: 2017-12-29 | End: 2018-01-26 | Stop reason: SDUPTHER

## 2017-12-29 RX ORDER — GUAIFENESIN, PSEUDOEPHEDRINE HYDROCHLORIDE 600; 60 MG/1; MG/1
1 TABLET, EXTENDED RELEASE ORAL EVERY 12 HOURS PRN
Qty: 18 TABLET | Refills: 0 | Status: SHIPPED | OUTPATIENT
Start: 2017-12-29 | End: 2018-01-05

## 2017-12-29 ASSESSMENT — ENCOUNTER SYMPTOMS
DIARRHEA: 0
RHINORRHEA: 1
SORE THROAT: 1
COUGH: 1
NAUSEA: 0
WHEEZING: 0
VOMITING: 0
ABDOMINAL PAIN: 0

## 2017-12-29 NOTE — PROGRESS NOTES
Information box to learn more about \"Upper Respiratory Infection (Cold): Care Instructions. \"     If you do not have an account, please click on the \"Sign Up Now\" link. Current as of: May 12, 2017  Content Version: 11.4  © 4159-8782 [employer]. Care instructions adapted under license by [facility] ([facility]). If you have questions about a medical condition or this instruction, always ask your healthcare professional. [address] any warranty or liability for your use of this information.

## 2018-01-09 ENCOUNTER — OFFICE VISIT (OUTPATIENT)
Dept: SURGERY | Age: 48
End: 2018-01-09

## 2018-01-09 ENCOUNTER — HOSPITAL ENCOUNTER (OUTPATIENT)
Dept: MAMMOGRAPHY | Age: 48
Discharge: HOME | End: 2018-01-09
Attending: SURGERY | Admitting: SURGERY

## 2018-01-09 VITALS
SYSTOLIC BLOOD PRESSURE: 120 MMHG | BODY MASS INDEX: 40.18 KG/M2 | HEART RATE: 68 BPM | HEIGHT: 66 IN | WEIGHT: 250 LBS | DIASTOLIC BLOOD PRESSURE: 76 MMHG

## 2018-01-09 DIAGNOSIS — Z90.12 HISTORY OF PARTIAL MASTECTOMY, LEFT: ICD-10-CM

## 2018-01-09 DIAGNOSIS — Z85.3 ENCOUNTER FOR FOLLOW-UP SURVEILLANCE OF BREAST CANCER: ICD-10-CM

## 2018-01-09 DIAGNOSIS — I89.0 LYMPHEDEMA OF BREAST: ICD-10-CM

## 2018-01-09 DIAGNOSIS — Z08 ENCOUNTER FOR FOLLOW-UP SURVEILLANCE OF BREAST CANCER: ICD-10-CM

## 2018-01-09 DIAGNOSIS — Z85.3 PERSONAL HISTORY OF BREAST CANCER: ICD-10-CM

## 2018-01-09 DIAGNOSIS — Z17.1 MALIGNANT NEOPLASM OF UPPER-INNER QUADRANT OF LEFT BREAST IN FEMALE, ESTROGEN RECEPTOR NEGATIVE (HCC): Primary | ICD-10-CM

## 2018-01-09 DIAGNOSIS — Z98.890 HISTORY OF BILATERAL BREAST REDUCTION SURGERY: ICD-10-CM

## 2018-01-09 DIAGNOSIS — C50.919 TRIPLE NEGATIVE MALIGNANT NEOPLASM OF BREAST (HCC): ICD-10-CM

## 2018-01-09 DIAGNOSIS — C50.212 MALIGNANT NEOPLASM OF UPPER-INNER QUADRANT OF LEFT BREAST IN FEMALE, ESTROGEN RECEPTOR NEGATIVE (HCC): Primary | ICD-10-CM

## 2018-01-09 DIAGNOSIS — C50.212 MALIGNANT NEOPLASM OF UPPER-INNER QUADRANT OF LEFT FEMALE BREAST (HCC): ICD-10-CM

## 2018-01-09 PROCEDURE — 99213 OFFICE O/P EST LOW 20 MIN: CPT | Performed by: SURGERY

## 2018-01-09 NOTE — PROGRESS NOTES
adenopathy  Extremity: Left upper extremity without evidence of lymphedema. Good range of motion. Good  and upper extremity muscle strength  General: AAO x 3, NAD, well nourished  Heart: RRR, no murmurs/gallops/rubs  Lungs: CTA B/L, no wheezes, rhonchi, rales   Neck: Supple, No JVD/Thyromegaly, No bruits  Psyche:  Good mood/Positive attitude  Skin: No rashes       Imaging:  Bilateral mammogram  Preliminary Report  Dr. Kelsie Gruber  BI-RADS 2, Benign          Assessment: 1. Complete pathologic response status post deanne-adjuvant chemotherapy: Left breast invasive ductal carcinoma, NG 3, ER-, UT-, Byw3eix -, Ki67 80%  2. History of left needle localized partial mastectomy with sentinel lymph node biopsy and bilateral breast reduction ([place]) 12/12/16  3. History of left breast and left axillary ultrasound guided core needle biopsy 4/26/16  4. History of right subclavian port a catheter placement 5/12/16      Plan:  1. Return to clinic six months, sooner if needed  2. Today's imaging reviewed and results discussed with the patient, all questions answered  4. Upper extremity measurements taken and documented  5. Continue MLD, and sleeve prn secondary to lymphedema. Patient states she never got a sleeve. So, prescription given for sleeve. 6. Patient continues to follow with medical oncology  7. Bilateral mammogram six months (1.5 year post op, annual screening)  8. Continue annual screening mammograms  9. Continue monthly self exams      A total of 10 minutes was spent face to face/involved with this patient, Greater than 50% was spent counseling, discussing imaging findings/report, reviewing her chart and answering her questions.

## 2018-01-26 ENCOUNTER — OFFICE VISIT (OUTPATIENT)
Dept: BARIATRICS/WEIGHT MGMT | Age: 48
End: 2018-01-26

## 2018-01-26 VITALS
HEART RATE: 76 BPM | SYSTOLIC BLOOD PRESSURE: 122 MMHG | RESPIRATION RATE: 16 BRPM | BODY MASS INDEX: 40.98 KG/M2 | WEIGHT: 255 LBS | DIASTOLIC BLOOD PRESSURE: 80 MMHG | HEIGHT: 66 IN

## 2018-01-26 DIAGNOSIS — E66.01 MORBID OBESITY WITH BMI OF 40.0-44.9, ADULT (HCC): Primary | ICD-10-CM

## 2018-01-26 DIAGNOSIS — K21.9 CHRONIC GERD: ICD-10-CM

## 2018-01-26 DIAGNOSIS — E78.2 MIXED HYPERLIPIDEMIA: ICD-10-CM

## 2018-01-26 PROCEDURE — 99213 OFFICE O/P EST LOW 20 MIN: CPT | Performed by: NURSE PRACTITIONER

## 2018-01-26 ASSESSMENT — ENCOUNTER SYMPTOMS
GASTROINTESTINAL NEGATIVE: 1
EYES NEGATIVE: 1
RESPIRATORY NEGATIVE: 1

## 2018-01-26 NOTE — PATIENT INSTRUCTIONS
Goals in preparing for bariatric surgery    You should be giving up all beverages that have carbonation, sugar, and caffeine. (Refer to the approved liquids list provided at initial visit)   You should be drinking 64 ounces of calorie free fluids per day. Suggestions include:  o Water (you may add fresh lemon or lime)  o Crystal Light  o Earling Liquid Water Enhancer  o Propel Zero  o Powerade Zero  o Isopure  o Veots6R  o SOBE Lifewater Zero  o Vitamin Water Zero  o Sugar Free Pablo-Aid      You should be eating 4-6 times per day.  Three small meals plus 1-2 snacks per day is your goal. This balances your calories and nutrients evenly throughout the day and helps to boost your metabolism. Snacking is a good thing if you are choosing healthful options. Refer to the snack list provided at your initial visit. Aim for a protein at every snack, plus a fruit, vegetable or starch. You should be eating protein at every meal and snack.  Protein is typically found in animal sources, i.e. chicken, beef, pork, fish and seafood, eggs. It is also found in low-fat dairy sources such as skim or 1% milk, low-fat yogurt, low-fat cheese, and low-fat cottage cheese. Plant based sources of protein include peanut butter, beans, and soy. You should be utilizing the 9-inch plate method.  Eating on a smaller plate will help you control portion size. But what you put on your plate counts also. Make ¼ of your plate protein, ¼ starch and ½ the plate non-starchy vegetables. You should be eliminating caffeine.  Caffeine is dehydrating. After surgery, its very important to stay hydrated. Giving up caffeine before surgery will help you focus on the changes necessary to be successful after surgery. There are many decaffeinated coffee and tea products available in grocery stores. You should be reducing added fat and sugar in your diet.  Frying foods adds too much fat and calories.  Baking, broiling, or grilling meats add flavor without unhealthy fats. Using cooking oil spray and spray butter products are also healthful changes that will aid in your weight loss. Foods high in sugar are often also high in calories and low in nutrients. Eating habits after surgery will have to be a permanent and long-term change. Eating habits are so ingrained that it can be difficult to change. It is important to practice new eating habits prior to surgery to mentally prepare yourself for the challenge ahead. Also remember that overall health, age, and genetics make each persons weight loss progress different. Do not compare your progress (pre or post-operatively), the amount you eat, or exercise to other patients. In addition, it is the responsibility of the patient to schedule and follow up on labs and tests completed during the pre-operative period. Results will be reviewed at each visit. Patient received dietary handouts and education.     Plan/Recommendations:   - Eliminate Juice  - Include protein with all meals and snacks  - Choose foods with 10 grams or less of sugar and 5 grams or less of fat  - Be consistent with exercise - walking at work + water aerobic 2/week

## 2018-01-29 RX ORDER — SODIUM CHLORIDE 0.9 % (FLUSH) 0.9 %
10 SYRINGE (ML) INJECTION EVERY 12 HOURS SCHEDULED
Status: CANCELLED | OUTPATIENT
Start: 2018-01-29

## 2018-01-29 RX ORDER — SODIUM CHLORIDE 0.9 % (FLUSH) 0.9 %
10 SYRINGE (ML) INJECTION PRN
Status: CANCELLED | OUTPATIENT
Start: 2018-01-29

## 2018-01-29 RX ORDER — SODIUM CHLORIDE 9 MG/ML
INJECTION, SOLUTION INTRAVENOUS CONTINUOUS
Status: CANCELLED | OUTPATIENT
Start: 2018-01-29

## 2018-02-04 ENCOUNTER — NURSE TRIAGE (OUTPATIENT)
Dept: OTHER | Facility: CLINIC | Age: 48
End: 2018-02-04

## 2018-02-04 NOTE — TELEPHONE ENCOUNTER
Reason for Disposition   Earache    Protocols used: COMMON COLD-ADULT-AH  Late Entry for Triage encounter called to [facility]. Please Refer to  for call information and disposition.

## 2018-02-09 ENCOUNTER — HOSPITAL ENCOUNTER (OUTPATIENT)
Dept: ENDOSCOPY | Age: 48
Discharge: HOME | End: 2018-01-23
Attending: SURGERY | Admitting: SURGERY

## 2018-02-23 ENCOUNTER — HOSPITAL ENCOUNTER (OUTPATIENT)
Dept: ENDOSCOPY | Age: 48
Discharge: HOME | End: 2018-02-23
Attending: SURGERY | Admitting: SURGERY

## 2018-02-23 VITALS
SYSTOLIC BLOOD PRESSURE: 123 MMHG | HEART RATE: 76 BPM | OXYGEN SATURATION: 100 % | BODY MASS INDEX: 40.86 KG/M2 | RESPIRATION RATE: 16 BRPM | HEIGHT: 66 IN | DIASTOLIC BLOOD PRESSURE: 76 MMHG | TEMPERATURE: 97.3 F | WEIGHT: 254.25 LBS

## 2018-02-23 PROCEDURE — 43239 EGD BIOPSY SINGLE/MULTIPLE: CPT | Performed by: SURGERY

## 2018-02-23 RX ORDER — SODIUM CHLORIDE 0.9 % (FLUSH) 0.9 %
10 SYRINGE (ML) INJECTION PRN
Status: DISCONTINUED | OUTPATIENT
Start: 2018-02-23 | End: 2018-02-24 | Stop reason: HOSPADM

## 2018-02-23 RX ORDER — OMEPRAZOLE 20 MG/1
20 CAPSULE, DELAYED RELEASE ORAL DAILY
Qty: 30 CAPSULE | Refills: 3 | Status: SHIPPED | OUTPATIENT
Start: 2018-02-23 | End: 2018-06-20 | Stop reason: SDUPTHER

## 2018-02-23 RX ORDER — SODIUM CHLORIDE 0.9 % (FLUSH) 0.9 %
10 SYRINGE (ML) INJECTION EVERY 12 HOURS SCHEDULED
Status: DISCONTINUED | OUTPATIENT
Start: 2018-02-23 | End: 2018-02-24 | Stop reason: HOSPADM

## 2018-02-23 RX ORDER — SODIUM CHLORIDE 9 MG/ML
INJECTION, SOLUTION INTRAVENOUS CONTINUOUS
Status: DISCONTINUED | OUTPATIENT
Start: 2018-02-23 | End: 2018-02-24 | Stop reason: HOSPADM

## 2018-02-23 RX ADMIN — SODIUM CHLORIDE: 9 INJECTION, SOLUTION INTRAVENOUS at 10:39

## 2018-02-23 ASSESSMENT — PAIN - FUNCTIONAL ASSESSMENT: PAIN_FUNCTIONAL_ASSESSMENT: 0-10

## 2018-02-23 NOTE — ANESTHESIA PRE-OP
mellitus                ROS comment: Breast cancer had radiation and chemo Abdominal:           Vascular:                                        Anesthesia Plan      MAC     ASA 2       Induction: intravenous. Anesthetic plan and risks discussed with patient. Plan discussed with CRNA.                   Earnest Dalton MD   2/23/2018

## 2018-02-23 NOTE — H&P
H&P Update  [facility]) Physicians   [facility]             Patient's History and Physical from January 26, 2018 was reviewed. Patient examined. Patient Active Problem List   Diagnosis    Allergic rhinitis    Chronic migraine    Hypercholesterolemia-ASCVD 1.1%  3/2016    Malignant neoplasm of upper-inner quadrant of left female breast ([address]    Triple negative malignant neoplasm of breast ([address]    Neoplastic malignant related fatigue    Chemotherapy-induced neuropathy ([address]    Malignant neoplasm of left female breast ([address]    Symptomatic mammary hypertrophy    Encounter for chemotherapy management    Lymphedema of breast    Obesity, Class II, BMI 35-39.9, with comorbidity    Arthritis of both knees-xray 2017    Pre-operative clearance    Chronic GERD    Mixed hyperlipidemia         There has been no change.         Gildardo Ochoa MD

## 2018-02-23 NOTE — OP NOTE
[facility]) Physicians   [facility]  [address] [place] [address], [address] [place] [id number] . Phone: [phone]  Fax: [phone]         Esophagogastroduodenoscopy     Indications: Pre-op gastric Surgery, history of / rule out Gastroesophageal reflux disease. Pre-operative Diagnosis: Obesity/pre-op bariatric surgery . Post-operative Diagnosis: same. Surgeon: Gildardo Ochoa MD    Anesthesia: General endotracheal anesthesia    ASA Class: 2    Procedure Details   The patient was seen in the Holding Room. The risks, benefits, complications, treatment options, and expected outcomes were discussed with the patient. Pre-op Endoscopy recommended to rule any intragastric pathology that would interfere with proposed procedure /  And or due to current conditions. The possibilities of reaction to medication, pulmonary aspiration, perforation of viscus, bleeding, recurrent infection, the need for additional procedures, failure to diagnose a condition, and creating a complication requiring transfusion or operation were discussed with the patient. The patient concurred with the proposed plan, giving informed consent. The site of surgery properly noted/marked. The patient was taken to Endoscopy Suite, identified as Cloyde Pulling and the procedure verified as  Esophagogastroduodenoscopy  A Time Out was held and the above information confirmed. Upper Endoscopy: An endoscope was inserted through the oropharynx into the upper esophagus. The endoscope was passed into the stomach to the level of the pylorus and passed to the duodenum where the ampulla was visualized and duodenum was normal. Then the scope was retracted back to the stomach and it was abnormal , gastritis biopsy of the antrum obtained, then retroflexed and the gastroesophageal junction was inspected . There was no hiatal hernia, no clear evidence of Thompson's.       Findings:  Esophageal findings include:             Upper: normal

## 2018-02-27 ENCOUNTER — OFFICE VISIT (OUTPATIENT)
Dept: BARIATRICS/WEIGHT MGMT | Age: 48
End: 2018-02-27

## 2018-02-27 VITALS
HEIGHT: 66 IN | SYSTOLIC BLOOD PRESSURE: 112 MMHG | BODY MASS INDEX: 41.14 KG/M2 | DIASTOLIC BLOOD PRESSURE: 66 MMHG | HEART RATE: 85 BPM | WEIGHT: 256 LBS

## 2018-02-27 DIAGNOSIS — K21.9 CHRONIC GERD: ICD-10-CM

## 2018-02-27 DIAGNOSIS — E66.01 MORBID OBESITY WITH BMI OF 40.0-44.9, ADULT (HCC): Primary | ICD-10-CM

## 2018-02-27 DIAGNOSIS — E78.2 MIXED HYPERLIPIDEMIA: ICD-10-CM

## 2018-02-27 PROCEDURE — 99213 OFFICE O/P EST LOW 20 MIN: CPT | Performed by: NURSE PRACTITIONER

## 2018-02-27 ASSESSMENT — ENCOUNTER SYMPTOMS
RESPIRATORY NEGATIVE: 1
EYES NEGATIVE: 1
GASTROINTESTINAL NEGATIVE: 1

## 2018-02-27 NOTE — PATIENT INSTRUCTIONS
There are many decaffeinated coffee and tea products available in grocery stores. You should be reducing added fat and sugar in your diet.  Frying foods adds too much fat and calories. Baking, broiling, or grilling meats add flavor without unhealthy fats. Using cooking oil spray and spray butter products are also healthful changes that will aid in your weight loss. Foods high in sugar are often also high in calories and low in nutrients. Eating habits after surgery will have to be a permanent and long-term change. Eating habits are so ingrained that it can be difficult to change. It is important to practice new eating habits prior to surgery to mentally prepare yourself for the challenge ahead. Also remember that overall health, age, and genetics make each persons weight loss progress different. Do not compare your progress (pre or post-operatively), the amount you eat, or exercise to other patients. In addition, it is the responsibility of the patient to schedule and follow up on labs and tests completed during the pre-operative period. Results will be reviewed at each visit.

## 2018-02-27 NOTE — PROGRESS NOTES
Teto Conley gained 1 lbs over 1 month. Pt states she isn't proud of much this past month d/t getting sick and being out of a routine. Attended cooking class. Feels she wasn't making the best choices. Breakfast: Struggles some on the weekends. Usually yogurt with string cheese OR oatmeal with piece of fruit mixed with water    Snack: piece of fruit OR mozzarella string cheese with 6 crackers OR balanced breaks    Lunch: leftovers- 1 oz turkey with some potatoes and carrots OR salad with protein (salmon/chicken or some black beans) usually with some olive oil, lemon, basil and apple cider vinegar for dressing. Snack: Something similar to previous snack    Dinner: chicken/fish (2 oz) and sweet potato and side of spinach salad    Snack: trying to have a little protein before bed. Example: 6 triscuits with some PB    Is pt consuming smaller portions? yes , she has. Pt using plate outer rim which helps her stya inside the rim. Is pt consuming at least 64 oz of fluids per day? no, around 32 oz. Is pt consuming carbonated, caffeinated, or sugary beverages? Yes, juice 2x this month. Has pt sampled Unjury and/or Nectar protein? Yes    Exercise: Was reduced d/t being sick. Pt has increased some walking.      Plan/Recommendations:   Increase water intake to 48 oz daily by next visit  Ensure a good protein source with all meals/snacks  Eliminate juice  Restart water aerobics class- 2x/week    Handouts: protein bar     Tammy Reas

## 2018-03-13 ENCOUNTER — INITIAL CONSULT (OUTPATIENT)
Dept: BARIATRICS/WEIGHT MGMT | Age: 48
End: 2018-03-13

## 2018-03-13 DIAGNOSIS — E66.01 MORBID OBESITY WITH BMI OF 40.0-44.9, ADULT (HCC): ICD-10-CM

## 2018-03-13 DIAGNOSIS — F43.22 ADJUSTMENT REACTION WITH ANXIETY: ICD-10-CM

## 2018-03-13 DIAGNOSIS — F33.40 DEPRESSION, MAJOR, RECURRENT, IN REMISSION (HCC): Primary | ICD-10-CM

## 2018-03-13 PROCEDURE — 99999 PR OFFICE/OUTPT VISIT,PROCEDURE ONLY: CPT | Performed by: PSYCHOLOGIST

## 2018-03-13 PROCEDURE — 90791 PSYCH DIAGNOSTIC EVALUATION: CPT | Performed by: PSYCHOLOGIST

## 2018-03-13 PROCEDURE — 96101 PR PSYCHOLOGIC TESTING BY PSYCH/PHYS: CPT | Performed by: PSYCHOLOGIST

## 2018-03-23 ENCOUNTER — HOSPITAL ENCOUNTER (OUTPATIENT)
Dept: PHYSICAL THERAPY | Age: 48
Discharge: HOME | End: 2018-03-31
Attending: RADIOLOGY | Admitting: RADIOLOGY

## 2018-03-23 DIAGNOSIS — I89.0 LYMPH EDEMA: Primary | ICD-10-CM

## 2018-03-27 ENCOUNTER — OFFICE VISIT (OUTPATIENT)
Dept: BARIATRICS/WEIGHT MGMT | Age: 48
End: 2018-03-27

## 2018-03-27 VITALS
BODY MASS INDEX: 41.3 KG/M2 | HEART RATE: 78 BPM | SYSTOLIC BLOOD PRESSURE: 121 MMHG | DIASTOLIC BLOOD PRESSURE: 70 MMHG | HEIGHT: 66 IN | WEIGHT: 257 LBS

## 2018-03-27 DIAGNOSIS — K21.9 CHRONIC GERD: ICD-10-CM

## 2018-03-27 DIAGNOSIS — E66.01 MORBID OBESITY WITH BMI OF 40.0-44.9, ADULT (HCC): Primary | ICD-10-CM

## 2018-03-27 DIAGNOSIS — E78.2 MIXED HYPERLIPIDEMIA: ICD-10-CM

## 2018-03-27 PROCEDURE — 99213 OFFICE O/P EST LOW 20 MIN: CPT | Performed by: NURSE PRACTITIONER

## 2018-03-27 ASSESSMENT — ENCOUNTER SYMPTOMS
EYES NEGATIVE: 1
GASTROINTESTINAL NEGATIVE: 1
RESPIRATORY NEGATIVE: 1

## 2018-03-27 NOTE — PROGRESS NOTES
[facility]) Physicians   [facility] [employer]    Subjective:      Patient ID: Zahira Baltazar is a 50 y.o. female    HPI    Presurgery    Zahira Baltazar is a very pleasant 50 y.o. female with Body mass index is 41.48 kg/m². Earnestnie Fuller Past Medical History:   Diagnosis Date    Allergic rhinitis     Anxiety 7/21/2015    Arthritis of both knees-xray 2017 9/26/2017    Cancer ([address]     breast cancer    Chronic GERD 11/14/2017    Depression     pt states \"situational anxiety\"    Gestational diabetes     History of blood transfusion     Hypercholesterolemia 3/14/2016    no meds    Migraine     Morbid obesity with BMI of 40.0-44.9, adult ([address] 4/21/2014    Prediabetes 3/14/2016    Sore gums     Trochanteric bursitis of right hip 4/21/2014     Past Surgical History:   Procedure Laterality Date    BREAST BIOPSY      CHOLECYSTECTOMY  1994    HYSTERECTOMY  2004    HYSTERECTOMY, VAGINAL      LASIK      OTHER SURGICAL HISTORY Right 05/12/2016    right subclavian port-a-cath    ROTATOR CUFF REPAIR Right 2012    UPPER GASTROINTESTINAL ENDOSCOPY  02/23/2018     Family History   Problem Relation Age of Onset    Heart Disease Mother     Arthritis Mother     Asthma Mother     Coronary Art Dis Mother     Heart Disease Father     Arthritis Father     Diabetes Father     Coronary Art Dis Father     Depression Father     Hypertension Father     Cancer Sister      Social History   Substance Use Topics    Smoking status: Never Smoker    Smokeless tobacco: Never Used    Alcohol use No      Comment: rarely     I counseled the patient on the importance of not smoking and risks of ETOH.    Allergies   Allergen Reactions    Adhesive Tape      Irritates skin    Percocet [Oxycodone-Acetaminophen] Itching and Nausea And Vomiting    Prozac [Fluoxetine] Nausea And Vomiting     Vitals:    03/27/18 1507   BP: 121/70   Pulse: 78   Weight: 257 lb (116.6 kg)   Height: 5' 6\" (1.676 m)        Body mass index is 41.48 kg/m². Current Outpatient Prescriptions:     omeprazole (PRILOSEC) 20 MG delayed release capsule, Take 1 capsule by mouth Daily, Disp: 30 capsule, Rfl: 3    NONFORMULARY, Take 615 mg by mouth daily, Disp: , Rfl:     citalopram (CELEXA) 20 MG tablet, TAKE ONE TABLET BY MOUTH ONE TIME A DAY FOR ANXIETY, Disp: 90 tablet, Rfl: 1    ergocalciferol (DRISDOL) 46419 UNITS capsule, 1 cap po twice weekly, Disp: 24 capsule, Rfl: 3    melatonin 3 MG TABS tablet, Take 10 mg by mouth daily, Disp: , Rfl:     B Complex Vitamins (VITAMIN B COMPLEX PO), Take by mouth, Disp: , Rfl:     fluticasone (FLONASE) 50 MCG/ACT nasal spray, 2 sprays each nostril daily, Disp: 3 Bottle, Rfl: 3    nystatin 801758 UNIT/GM POWD, APPLY AS DIRECTED TWICE DAILY, Disp: 15 g, Rfl: 1    SUMAtriptan (IMITREX) 100 MG tablet, Take 1 tablet by mouth once as needed for Migraine, Disp: 27 tablet, Rfl: 1      Review of Systems   Constitutional: Negative. HENT: Negative. Eyes: Negative. Respiratory: Negative. Cardiovascular: Negative. Gastrointestinal: Negative. Skin: Negative. Neurological: Negative. Objective:   Physical Exam   Constitutional: She is oriented to person, place, and time. She appears well-developed and well-nourished. HENT:   Head: Normocephalic and atraumatic. Eyes: Pupils are equal, round, and reactive to light. Neck: Normal range of motion. Cardiovascular: Normal rate. Pulmonary/Chest: Effort normal.   Musculoskeletal: Normal range of motion. Neurological: She is alert and oriented to person, place, and time. Psychiatric: She has a normal mood and affect. Her behavior is normal. Judgment and thought content normal.         Assessment and Plan:       Patient is here for their 5th presurgery visit for sleeve, gained 1 lb. The patient's current Body mass index is 41.48 kg/m². (3/27/18). She  is making dietary and behavior modifications. She has increased her fluids.  She did meet with the registered dietitian for continued follow up. I agree with recommendations and plan. She is trying to be more active, encouraged physical activity. Discussed preop work up which includes attending a support group. We will see her back in 1 month for continued follow up. I have spent 15 min counseling patient.

## 2018-03-27 NOTE — PATIENT INSTRUCTIONS
release gas and can expand the stomach.  Continue to keep temptation from your kitchen- Keep your pantry and kitchen cabinets cleaned out of those dangerous foods that might tempt you after surgery (chips, cookies, candy, etc.).  Continue to increase your exercise program- Increase your daily physical activity. Aim for 5-6 days per week for 30 minutes. Walking is an easy way to get started with exercising. Exercise is going to be a regular part of your life after surgery.  Make sure you have a good support system- There will be many changes and adjustments to make after surgery. It is important to have a supportive friend, family member or co-worker, etc. with whom you can talk. Continue to attend [facility]) Weight Management support groups as they can be helpful in maintaining behaviors. In addition, it is the responsibility of the patient to schedule and follow up on labs and tests completed after surgery. Results will be reviewed at each visit. Patient received dietary handouts and education.

## 2018-03-28 ENCOUNTER — OFFICE VISIT (OUTPATIENT)
Dept: SURGERY | Age: 48
End: 2018-03-28

## 2018-03-28 ENCOUNTER — HOSPITAL ENCOUNTER (OUTPATIENT)
Dept: ULTRASOUND IMAGING | Age: 48
Discharge: HOME | End: 2018-03-28
Attending: SURGERY | Admitting: SURGERY

## 2018-03-28 VITALS
WEIGHT: 255 LBS | HEIGHT: 66 IN | BODY MASS INDEX: 40.98 KG/M2 | SYSTOLIC BLOOD PRESSURE: 106 MMHG | HEART RATE: 74 BPM | DIASTOLIC BLOOD PRESSURE: 67 MMHG

## 2018-03-28 DIAGNOSIS — Z98.890 HISTORY OF BILATERAL BREAST REDUCTION SURGERY: ICD-10-CM

## 2018-03-28 DIAGNOSIS — Z90.12 HISTORY OF PARTIAL MASTECTOMY, LEFT: ICD-10-CM

## 2018-03-28 DIAGNOSIS — N64.89 BREAST EDEMA: ICD-10-CM

## 2018-03-28 DIAGNOSIS — N64.89 BREAST EDEMA: Primary | ICD-10-CM

## 2018-03-28 DIAGNOSIS — R92.8 ABNORMAL FINDING ON MAMMOGRAPHY: ICD-10-CM

## 2018-03-28 DIAGNOSIS — N64.4 BREAST PAIN: ICD-10-CM

## 2018-03-28 DIAGNOSIS — Z17.1 MALIGNANT NEOPLASM OF UPPER-INNER QUADRANT OF LEFT BREAST IN FEMALE, ESTROGEN RECEPTOR NEGATIVE (HCC): ICD-10-CM

## 2018-03-28 DIAGNOSIS — C50.212 MALIGNANT NEOPLASM OF UPPER-INNER QUADRANT OF LEFT BREAST IN FEMALE, ESTROGEN RECEPTOR NEGATIVE (HCC): ICD-10-CM

## 2018-03-28 DIAGNOSIS — N63.0 BREAST NODULE: ICD-10-CM

## 2018-03-28 PROCEDURE — 99213 OFFICE O/P EST LOW 20 MIN: CPT | Performed by: SURGERY

## 2018-03-28 NOTE — PROGRESS NOTES
Patient Terry Mcdonough  Date of Birth: 1970  Primary Care Physician: George Angel MD  Medical Oncology: Mary Danielle MD      Subjective: Patient presents secondary to complaint of left breast pain. She states that this has been occurring for about 1 month. She states that she feels the breast edema has also gotten worse. And, the edema in her left upper extremity has also increased. She states she was seen by PT and a recommendation was made to wear her sleeve/glove and obtain one that was one piece verse two. She also reports that she feels that she has extra weight on her chest and she needs to breath deeper. She denies any chest pain. She denies any shortness of breath, but feels a \"heavyness\" or \"congested feeling\". She has not seen her PCP related to these complaints. She drinks no caffeine. She denies any bilateral palpable masses/lesions. She denies any new bilateral skin changes/erythema/thickening/dimpling.   She denies any nipple changes/retraction or discharge bilaterally.      Pathology:   Post deanne-adjuvant chemotherapy  Complete pathologic response     Pre deanne-adjuvant chemotherapy  Left breast invasive ductal carcinoma, NG 3  ER-, [place]-, [id number] -  Tumor no evidence of residual tumor seen at surgery, 0/5 sentinel lymph nodes positive (all sentinel nodes negative)  Ki67 80%          Vitals:    03/28/18 0933   BP: 106/67   Pulse: 74   Weight: 255 lb (115.7 kg)   Height: 5' 6\" (1.676 m)                   ROS  Constitutional: no weight loss, fever, night sweats   Skin: negative  Cardiovascular: no chest pain or palpitations   Pulmonary: No cough, sputum, or hemoptysis   GI:No abdominal pain  Breast: see above  All other systems were reviewed and are negative          Objective:  Breast/Chest:  Overall, the bilateral breasts have excellent cosmesis and contour and are symmetrical. The bilateral nipples are everted without discharge and the skin is without erythema/thickening

## 2018-04-01 ENCOUNTER — HOSPITAL ENCOUNTER (OUTPATIENT)
Dept: OTHER | Age: 48
Discharge: HOME | End: 2018-04-30
Attending: RADIOLOGY | Admitting: RADIOLOGY

## 2018-04-03 ENCOUNTER — OFFICE VISIT (OUTPATIENT)
Dept: PRIMARY CARE CLINIC | Age: 48
End: 2018-04-03

## 2018-04-03 ENCOUNTER — HOSPITAL ENCOUNTER (OUTPATIENT)
Dept: GENERAL RADIOLOGY | Facility: MEDICAL CENTER | Age: 48
Discharge: HOME | End: 2018-04-03
Attending: INTERNAL MEDICINE | Admitting: INTERNAL MEDICINE

## 2018-04-03 VITALS
SYSTOLIC BLOOD PRESSURE: 118 MMHG | DIASTOLIC BLOOD PRESSURE: 80 MMHG | TEMPERATURE: 98.5 F | WEIGHT: 250 LBS | BODY MASS INDEX: 40.18 KG/M2 | HEIGHT: 66 IN

## 2018-04-03 DIAGNOSIS — R20.0 BILATERAL HAND NUMBNESS: ICD-10-CM

## 2018-04-03 DIAGNOSIS — R53.82 CHRONIC FATIGUE: Primary | ICD-10-CM

## 2018-04-03 DIAGNOSIS — R07.89 CHEST PRESSURE: ICD-10-CM

## 2018-04-03 LAB
A/G RATIO: 1.8 (ref 1.1–2.2)
ALBUMIN SERPL-MCNC: 4.4 G/DL (ref 3.4–5)
ALP BLD-CCNC: 111 U/L (ref 40–129)
ALT SERPL-CCNC: 13 U/L (ref 10–40)
ANION GAP SERPL CALCULATED.3IONS-SCNC: 14 MMOL/L (ref 3–16)
AST SERPL-CCNC: 14 U/L (ref 15–37)
BASOPHILS ABSOLUTE: 0 K/UL (ref 0–0.2)
BASOPHILS RELATIVE PERCENT: 0.6 %
BILIRUB SERPL-MCNC: <0.2 MG/DL (ref 0–1)
BUN BLDV-MCNC: 16 MG/DL (ref 7–20)
CALCIUM SERPL-MCNC: 9.2 MG/DL (ref 8.3–10.6)
CHLORIDE BLD-SCNC: 102 MMOL/L (ref 99–110)
CO2: 27 MMOL/L (ref 21–32)
CREAT SERPL-MCNC: 0.7 MG/DL (ref 0.6–1.1)
EOSINOPHILS ABSOLUTE: 0 K/UL (ref 0–0.6)
EOSINOPHILS RELATIVE PERCENT: 1.2 %
GFR AFRICAN AMERICAN: >60
GFR NON-AFRICAN AMERICAN: >60
GLOBULIN: 2.4 G/DL
GLUCOSE BLD-MCNC: 92 MG/DL (ref 70–99)
HCT VFR BLD CALC: 38.3 % (ref 36–48)
HEMOGLOBIN: 13 G/DL (ref 12–16)
LYMPHOCYTES ABSOLUTE: 1.1 K/UL (ref 1–5.1)
LYMPHOCYTES RELATIVE PERCENT: 27.5 %
MCH RBC QN AUTO: 32.1 PG (ref 26–34)
MCHC RBC AUTO-ENTMCNC: 34 G/DL (ref 31–36)
MCV RBC AUTO: 94.4 FL (ref 80–100)
MONOCYTES ABSOLUTE: 0.4 K/UL (ref 0–1.3)
MONOCYTES RELATIVE PERCENT: 8.8 %
NEUTROPHILS ABSOLUTE: 2.5 K/UL (ref 1.7–7.7)
NEUTROPHILS RELATIVE PERCENT: 61.9 %
PDW BLD-RTO: 13.2 % (ref 12.4–15.4)
PLATELET # BLD: 197 K/UL (ref 135–450)
PMV BLD AUTO: 10.2 FL (ref 5–10.5)
POTASSIUM SERPL-SCNC: 4.3 MMOL/L (ref 3.5–5.1)
RBC # BLD: 4.05 M/UL (ref 4–5.2)
SODIUM BLD-SCNC: 143 MMOL/L (ref 136–145)
TOTAL PROTEIN: 6.8 G/DL (ref 6.4–8.2)
TSH REFLEX: 2.57 UIU/ML (ref 0.27–4.2)
WBC # BLD: 4.1 K/UL (ref 4–11)

## 2018-04-03 PROCEDURE — 99215 OFFICE O/P EST HI 40 MIN: CPT | Performed by: INTERNAL MEDICINE

## 2018-04-03 PROCEDURE — 93000 ELECTROCARDIOGRAM COMPLETE: CPT | Performed by: INTERNAL MEDICINE

## 2018-04-10 ENCOUNTER — HOSPITAL ENCOUNTER (OUTPATIENT)
Dept: NON INVASIVE DIAGNOSTICS | Age: 48
Discharge: HOME | End: 2018-04-10
Attending: INTERNAL MEDICINE | Admitting: INTERNAL MEDICINE

## 2018-04-10 DIAGNOSIS — R07.89 OTHER CHEST PAIN: ICD-10-CM

## 2018-04-12 PROBLEM — Z01.818 PRE-OPERATIVE CLEARANCE: Status: RESOLVED | Noted: 2017-11-14 | Resolved: 2018-04-12

## 2018-04-13 ENCOUNTER — OFFICE VISIT (OUTPATIENT)
Dept: SURGERY | Age: 48
End: 2018-04-13

## 2018-04-13 VITALS
BODY MASS INDEX: 40.18 KG/M2 | DIASTOLIC BLOOD PRESSURE: 71 MMHG | WEIGHT: 250 LBS | HEIGHT: 66 IN | SYSTOLIC BLOOD PRESSURE: 120 MMHG | HEART RATE: 64 BPM

## 2018-04-13 DIAGNOSIS — N64.4 BREAST PAIN: Primary | ICD-10-CM

## 2018-04-13 DIAGNOSIS — Z98.890 STATUS POST LEFT BREAST BIOPSY: ICD-10-CM

## 2018-04-13 PROCEDURE — 99212 OFFICE O/P EST SF 10 MIN: CPT | Performed by: SURGERY

## 2018-04-20 ENCOUNTER — OFFICE VISIT (OUTPATIENT)
Dept: BARIATRICS/WEIGHT MGMT | Age: 48
End: 2018-04-20

## 2018-04-20 ENCOUNTER — TELEPHONE (OUTPATIENT)
Dept: SURGERY | Age: 48
End: 2018-04-20

## 2018-04-20 VITALS
SYSTOLIC BLOOD PRESSURE: 116 MMHG | WEIGHT: 257 LBS | DIASTOLIC BLOOD PRESSURE: 74 MMHG | RESPIRATION RATE: 16 BRPM | HEIGHT: 66 IN | HEART RATE: 78 BPM | BODY MASS INDEX: 41.3 KG/M2

## 2018-04-20 DIAGNOSIS — K21.9 CHRONIC GERD: ICD-10-CM

## 2018-04-20 DIAGNOSIS — E78.2 MIXED HYPERLIPIDEMIA: ICD-10-CM

## 2018-04-20 DIAGNOSIS — E66.01 MORBID OBESITY WITH BMI OF 40.0-44.9, ADULT (HCC): Primary | ICD-10-CM

## 2018-04-20 PROCEDURE — 99213 OFFICE O/P EST LOW 20 MIN: CPT | Performed by: NURSE PRACTITIONER

## 2018-04-20 ASSESSMENT — ENCOUNTER SYMPTOMS
RESPIRATORY NEGATIVE: 1
EYES NEGATIVE: 1
GASTROINTESTINAL NEGATIVE: 1

## 2018-04-24 ENCOUNTER — HOSPITAL ENCOUNTER (OUTPATIENT)
Dept: MRI IMAGING | Age: 48
Discharge: HOME | End: 2018-04-24
Attending: SURGERY | Admitting: SURGERY

## 2018-04-24 DIAGNOSIS — N64.4 BREAST PAIN: ICD-10-CM

## 2018-04-24 DIAGNOSIS — Z90.12 HISTORY OF PARTIAL MASTECTOMY, LEFT: ICD-10-CM

## 2018-04-24 DIAGNOSIS — C50.212 MALIGNANT NEOPLASM OF UPPER-INNER QUADRANT OF LEFT BREAST IN FEMALE, ESTROGEN RECEPTOR NEGATIVE (HCC): ICD-10-CM

## 2018-04-24 DIAGNOSIS — Z17.1 MALIGNANT NEOPLASM OF UPPER-INNER QUADRANT OF LEFT BREAST IN FEMALE, ESTROGEN RECEPTOR NEGATIVE (HCC): ICD-10-CM

## 2018-04-24 DIAGNOSIS — N64.89 OTHER SPECIFIED DISORDERS OF BREAST (CODE): ICD-10-CM

## 2018-04-24 DIAGNOSIS — N64.89 BREAST EDEMA: ICD-10-CM

## 2018-05-01 ENCOUNTER — HOSPITAL ENCOUNTER (OUTPATIENT)
Dept: OTHER | Age: 48
Discharge: HOME | End: 2018-05-31
Attending: RADIOLOGY | Admitting: RADIOLOGY

## 2018-05-23 ENCOUNTER — OFFICE VISIT (OUTPATIENT)
Dept: URGENT CARE | Age: 48
End: 2018-05-23

## 2018-05-23 VITALS
OXYGEN SATURATION: 98 % | SYSTOLIC BLOOD PRESSURE: 110 MMHG | TEMPERATURE: 97 F | HEIGHT: 66 IN | RESPIRATION RATE: 16 BRPM | WEIGHT: 261 LBS | BODY MASS INDEX: 41.95 KG/M2 | DIASTOLIC BLOOD PRESSURE: 80 MMHG | HEART RATE: 70 BPM

## 2018-05-23 DIAGNOSIS — S16.1XXA STRAIN OF NECK MUSCLE, INITIAL ENCOUNTER: Primary | ICD-10-CM

## 2018-05-23 PROCEDURE — 99214 OFFICE O/P EST MOD 30 MIN: CPT | Performed by: EMERGENCY MEDICINE

## 2018-05-23 RX ORDER — METHOCARBAMOL 500 MG/1
500 TABLET, FILM COATED ORAL 3 TIMES DAILY PRN
Qty: 20 TABLET | Refills: 0 | Status: SHIPPED | OUTPATIENT
Start: 2018-05-23 | End: 2018-06-02

## 2018-05-23 RX ORDER — HYDROCODONE BITARTRATE AND ACETAMINOPHEN 5; 325 MG/1; MG/1
1 TABLET ORAL EVERY 6 HOURS PRN
Qty: 20 TABLET | Refills: 0 | Status: SHIPPED | OUTPATIENT
Start: 2018-05-23 | End: 2018-05-30

## 2018-05-25 ENCOUNTER — OFFICE VISIT (OUTPATIENT)
Dept: BARIATRICS/WEIGHT MGMT | Age: 48
End: 2018-05-25

## 2018-05-25 ENCOUNTER — EMPLOYEE WELLNESS (OUTPATIENT)
Dept: OTHER | Age: 48
End: 2018-05-25

## 2018-05-25 VITALS
BODY MASS INDEX: 41.78 KG/M2 | DIASTOLIC BLOOD PRESSURE: 79 MMHG | HEIGHT: 66 IN | HEART RATE: 83 BPM | SYSTOLIC BLOOD PRESSURE: 123 MMHG | WEIGHT: 260 LBS

## 2018-05-25 DIAGNOSIS — E78.2 MIXED HYPERLIPIDEMIA: ICD-10-CM

## 2018-05-25 DIAGNOSIS — K21.9 CHRONIC GERD: ICD-10-CM

## 2018-05-25 DIAGNOSIS — E66.01 MORBID OBESITY WITH BMI OF 40.0-44.9, ADULT (HCC): Primary | ICD-10-CM

## 2018-05-25 LAB
CHOLESTEROL, TOTAL: 253 MG/DL (ref 0–199)
GLUCOSE BLD-MCNC: 94 MG/DL (ref 70–99)
HDLC SERPL-MCNC: 59 MG/DL (ref 40–60)
LDL CHOLESTEROL CALCULATED: 177 MG/DL
TRIGL SERPL-MCNC: 86 MG/DL (ref 0–150)

## 2018-05-25 PROCEDURE — 99213 OFFICE O/P EST LOW 20 MIN: CPT | Performed by: NURSE PRACTITIONER

## 2018-05-25 ASSESSMENT — ENCOUNTER SYMPTOMS
RESPIRATORY NEGATIVE: 1
EYES NEGATIVE: 1
GASTROINTESTINAL NEGATIVE: 1

## 2018-05-29 ENCOUNTER — HOSPITAL ENCOUNTER (OUTPATIENT)
Dept: OTHER | Age: 48
Discharge: HOME | End: 2018-05-29
Attending: INTERNAL MEDICINE | Admitting: INTERNAL MEDICINE

## 2018-05-29 VITALS — WEIGHT: 260 LBS | BODY MASS INDEX: 41.97 KG/M2

## 2018-05-29 DIAGNOSIS — R20.0 ANESTHESIA OF SKIN: ICD-10-CM

## 2018-05-29 PROCEDURE — 95911 NRV CNDJ TEST 9-10 STUDIES: CPT | Performed by: PSYCHIATRY & NEUROLOGY

## 2018-05-29 PROCEDURE — 95886 MUSC TEST DONE W/N TEST COMP: CPT | Performed by: PSYCHIATRY & NEUROLOGY

## 2018-05-31 RX ORDER — ERGOCALCIFEROL (VITAMIN D2) 1250 MCG
CAPSULE ORAL
Qty: 24 CAPSULE | Refills: 3 | Status: SHIPPED | OUTPATIENT
Start: 2018-05-31

## 2018-06-01 ENCOUNTER — HOSPITAL ENCOUNTER (OUTPATIENT)
Dept: OTHER | Age: 48
Discharge: HOME | End: 2018-06-30
Attending: RADIOLOGY | Admitting: RADIOLOGY

## 2018-06-04 ENCOUNTER — OFFICE VISIT (OUTPATIENT)
Dept: URGENT CARE | Age: 48
End: 2018-06-04

## 2018-06-04 VITALS
WEIGHT: 258 LBS | SYSTOLIC BLOOD PRESSURE: 115 MMHG | RESPIRATION RATE: 16 BRPM | HEART RATE: 70 BPM | TEMPERATURE: 96.9 F | OXYGEN SATURATION: 98 % | BODY MASS INDEX: 41.46 KG/M2 | DIASTOLIC BLOOD PRESSURE: 72 MMHG | HEIGHT: 66 IN

## 2018-06-04 DIAGNOSIS — L30.9 DERMATITIS: Primary | ICD-10-CM

## 2018-06-04 PROCEDURE — 99214 OFFICE O/P EST MOD 30 MIN: CPT | Performed by: EMERGENCY MEDICINE

## 2018-06-04 ASSESSMENT — ENCOUNTER SYMPTOMS
SORE THROAT: 0
RHINORRHEA: 0

## 2018-06-08 PROBLEM — G56.01 CARPAL TUNNEL SYNDROME OF RIGHT WRIST: Status: ACTIVE | Noted: 2018-06-08

## 2018-06-20 RX ORDER — OMEPRAZOLE 20 MG/1
CAPSULE, DELAYED RELEASE ORAL
Qty: 30 CAPSULE | Refills: 3 | Status: SHIPPED | OUTPATIENT
Start: 2018-06-20 | End: 2018-06-22

## 2018-06-22 ENCOUNTER — TELEPHONE (OUTPATIENT)
Dept: BARIATRICS/WEIGHT MGMT | Age: 48
End: 2018-06-22

## 2018-06-22 DIAGNOSIS — K21.9 CHRONIC GERD: Primary | ICD-10-CM

## 2018-06-22 RX ORDER — OMEPRAZOLE 20 MG/1
20 CAPSULE, DELAYED RELEASE ORAL DAILY
Qty: 30 CAPSULE | Refills: 3 | Status: SHIPPED | OUTPATIENT
Start: 2018-06-22 | End: 2019-05-16

## 2018-06-25 RX ORDER — NYSTATIN 100000 [USP'U]/G
POWDER TOPICAL
Qty: 56.7 G | Refills: 1 | Status: SHIPPED | OUTPATIENT
Start: 2018-06-25 | End: 2020-07-31

## 2018-07-12 NOTE — PROGRESS NOTES
Patient Name:   Holly Mayfield       Type of Surgery:  Sleeve         Date of Surgery:  August 22, 2018      Start Pre-Op Diet On:  August 9, 2018      Start Clear Liquids On:  August 21, 2018      If you are having a gastric bypass, start Bowel Prep between 2-4pm the day prior to surgery. NPO after midnight the night before your surgery! Take morning medications with a small amount of water.     NOTES:_______________________________________  ______________________________________________

## 2018-07-17 ENCOUNTER — OFFICE VISIT (OUTPATIENT)
Dept: BARIATRICS/WEIGHT MGMT | Age: 48
End: 2018-07-17

## 2018-07-17 VITALS
OXYGEN SATURATION: 98 % | DIASTOLIC BLOOD PRESSURE: 77 MMHG | BODY MASS INDEX: 41.56 KG/M2 | HEART RATE: 70 BPM | WEIGHT: 258.6 LBS | HEIGHT: 66 IN | SYSTOLIC BLOOD PRESSURE: 141 MMHG

## 2018-07-17 DIAGNOSIS — E78.2 MIXED HYPERLIPIDEMIA: ICD-10-CM

## 2018-07-17 DIAGNOSIS — K21.9 CHRONIC GERD: Primary | ICD-10-CM

## 2018-07-17 DIAGNOSIS — E66.01 MORBID OBESITY WITH BMI OF 40.0-44.9, ADULT (HCC): ICD-10-CM

## 2018-07-17 PROCEDURE — 99213 OFFICE O/P EST LOW 20 MIN: CPT | Performed by: NURSE PRACTITIONER

## 2018-07-17 PROCEDURE — 99999 PR OFFICE/OUTPT VISIT,PROCEDURE ONLY: CPT | Performed by: SURGERY

## 2018-07-17 ASSESSMENT — ENCOUNTER SYMPTOMS
NAUSEA: 0
ABDOMINAL PAIN: 0
RESPIRATORY NEGATIVE: 1
EYES NEGATIVE: 1
COUGH: 0
ALLERGIC/IMMUNOLOGIC NEGATIVE: 1
GASTROINTESTINAL NEGATIVE: 1
SHORTNESS OF BREATH: 0
DIARRHEA: 0

## 2018-07-17 NOTE — PROGRESS NOTES
risks of ETOH. Allergies   Allergen Reactions    Adhesive Tape      Irritates skin    Percocet [Oxycodone-Acetaminophen] Itching and Nausea And Vomiting    Prozac [Fluoxetine] Nausea And Vomiting     Vitals:    07/17/18 1308 07/17/18 1346   BP: (!) 149/94 (!) 141/77   Pulse: 70    SpO2: 98%    Weight: 258 lb 9.6 oz (117.3 kg)    Height: 5' 6\" (1.676 m)      Body mass index is 41.74 kg/m². Current Outpatient Prescriptions:     nystatin (NYSTATIN) 260780 UNIT/GM powder, APPLY AS DIRECTED TWICE DAILY, Disp: 56.7 g, Rfl: 1    omeprazole (PRILOSEC) 20 MG delayed release capsule, Take 1 capsule by mouth Daily, Disp: 30 capsule, Rfl: 3    ergocalciferol (DRISDOL) 46080 units capsule, 1 cap po twice weekly, Disp: 24 capsule, Rfl: 3    NONFORMULARY, Take 615 mg by mouth daily, Disp: , Rfl:     citalopram (CELEXA) 20 MG tablet, TAKE ONE TABLET BY MOUTH ONE TIME A DAY FOR ANXIETY, Disp: 90 tablet, Rfl: 1    melatonin 3 MG TABS tablet, Take 10 mg by mouth daily, Disp: , Rfl:     B Complex Vitamins (VITAMIN B COMPLEX PO), Take by mouth, Disp: , Rfl:     fluticasone (FLONASE) 50 MCG/ACT nasal spray, 2 sprays each nostril daily, Disp: 3 Bottle, Rfl: 3    SUMAtriptan (IMITREX) 100 MG tablet, Take 1 tablet by mouth once as needed for Migraine, Disp: 27 tablet, Rfl: 1    Review of Systems   Constitutional: Negative. Negative for chills and fever. HENT: Negative. Eyes: Negative. Respiratory: Negative. Negative for cough and shortness of breath. Cardiovascular: Negative. Gastrointestinal: Negative. Negative for abdominal pain, diarrhea and nausea. Endocrine: Negative. Genitourinary: Negative. Musculoskeletal: Negative. Skin: Negative. Allergic/Immunologic: Negative. Neurological: Negative. Hematological: Negative. Psychiatric/Behavioral: Negative. Objective:     Physical Exam   Constitutional: She is oriented to person, place, and time.  She appears well-developed and

## 2018-07-20 ENCOUNTER — TELEPHONE (OUTPATIENT)
Dept: BARIATRICS/WEIGHT MGMT | Age: 48
End: 2018-07-20

## 2018-07-20 NOTE — TELEPHONE ENCOUNTER
Medical [place] approval for sleeve submitted.   CPT [id number] & [id number]   DOS 8/22/18   [facility]

## 2018-07-25 ENCOUNTER — TELEPHONE (OUTPATIENT)
Dept: BARIATRICS/WEIGHT MGMT | Age: 48
End: 2018-07-25

## 2018-07-30 NOTE — TELEPHONE ENCOUNTER
[employer] approval #     [id number]   DOS 8/22/18   CPT 21144  & [id number] (NPR)    Valid 7/20/18-7/25/19                 Call ref# [id number]  [facility] [place] location of [facility] given.

## 2018-08-11 ENCOUNTER — HOSPITAL ENCOUNTER (OUTPATIENT)
Dept: OTHER | Age: 48
Discharge: HOME | End: 2018-08-11
Attending: SURGERY | Admitting: SURGERY

## 2018-08-11 LAB
A/G RATIO: 1.3 (ref 1.1–2.2)
ABO/RH: NORMAL
ALBUMIN SERPL-MCNC: 4.2 G/DL (ref 3.4–5)
ALP BLD-CCNC: 123 U/L (ref 40–129)
ALT SERPL-CCNC: 10 U/L (ref 10–40)
ANION GAP SERPL CALCULATED.3IONS-SCNC: 12 MMOL/L (ref 3–16)
ANTIBODY SCREEN: NORMAL
AST SERPL-CCNC: 15 U/L (ref 15–37)
BILIRUB SERPL-MCNC: 0.5 MG/DL (ref 0–1)
BUN BLDV-MCNC: 16 MG/DL (ref 7–20)
CALCIUM SERPL-MCNC: 9.8 MG/DL (ref 8.3–10.6)
CHLORIDE BLD-SCNC: 101 MMOL/L (ref 99–110)
CO2: 28 MMOL/L (ref 21–32)
CREAT SERPL-MCNC: 0.8 MG/DL (ref 0.6–1.1)
GFR AFRICAN AMERICAN: >60
GFR NON-AFRICAN AMERICAN: >60
GLOBULIN: 3.3 G/DL
GLUCOSE BLD-MCNC: 84 MG/DL (ref 70–99)
HCT VFR BLD CALC: 39.7 % (ref 36–48)
HEMOGLOBIN: 13.5 G/DL (ref 12–16)
MCH RBC QN AUTO: 31.3 PG (ref 26–34)
MCHC RBC AUTO-ENTMCNC: 34 G/DL (ref 31–36)
MCV RBC AUTO: 92.1 FL (ref 80–100)
PDW BLD-RTO: 12.8 % (ref 12.4–15.4)
PLATELET # BLD: 217 K/UL (ref 135–450)
PMV BLD AUTO: 9.4 FL (ref 5–10.5)
POTASSIUM SERPL-SCNC: 4.6 MMOL/L (ref 3.5–5.1)
RBC # BLD: 4.31 M/UL (ref 4–5.2)
SODIUM BLD-SCNC: 141 MMOL/L (ref 136–145)
TOTAL PROTEIN: 7.5 G/DL (ref 6.4–8.2)
WBC # BLD: 4 K/UL (ref 4–11)

## 2018-08-20 ENCOUNTER — OFFICE VISIT (OUTPATIENT)
Dept: PRIMARY CARE CLINIC | Age: 48
End: 2018-08-20

## 2018-08-20 VITALS
BODY MASS INDEX: 40.37 KG/M2 | TEMPERATURE: 98.1 F | WEIGHT: 251.2 LBS | SYSTOLIC BLOOD PRESSURE: 122 MMHG | DIASTOLIC BLOOD PRESSURE: 84 MMHG | HEIGHT: 66 IN | HEART RATE: 70 BPM

## 2018-08-20 DIAGNOSIS — Z01.818 PREOP EXAMINATION: Primary | ICD-10-CM

## 2018-08-20 PROCEDURE — 99244 OFF/OP CNSLTJ NEW/EST MOD 40: CPT | Performed by: INTERNAL MEDICINE

## 2018-08-22 PROBLEM — Z98.84 S/P LAPAROSCOPIC SLEEVE GASTRECTOMY: Status: ACTIVE | Noted: 2018-08-22

## 2018-08-27 ENCOUNTER — TELEPHONE (OUTPATIENT)
Dept: BARIATRICS/WEIGHT MGMT | Age: 48
End: 2018-08-27

## 2018-08-27 NOTE — TELEPHONE ENCOUNTER
Attempted to reach pt to address this matter and complete 1 week f/up call. Left VM with our contact information and asked to them return our call.

## 2018-08-27 NOTE — TELEPHONE ENCOUNTER
Patient;s  called in stating she is having pain on the left side incision are,  is not able to get her fluids in because of nausea. Explained having left sided pain is very normal.  Patient needs to get up and move around as much as possible and that it is imperative patient get fluids in. If she is feeling nauseated Dr. Chichi Lovelace gave her a script for Zofran when she left the hospital, take as directed. Ice area of soreness on the left side.   Advised this MA would send a message to dieticians for a call back to assess I & O

## 2018-08-28 ENCOUNTER — TELEPHONE (OUTPATIENT)
Dept: BARIATRICS/WEIGHT MGMT | Age: 48
End: 2018-08-28

## 2018-09-06 ENCOUNTER — OFFICE VISIT (OUTPATIENT)
Dept: BARIATRICS/WEIGHT MGMT | Age: 48
End: 2018-09-06

## 2018-09-06 VITALS
DIASTOLIC BLOOD PRESSURE: 88 MMHG | HEART RATE: 104 BPM | BODY MASS INDEX: 37.86 KG/M2 | HEIGHT: 66 IN | WEIGHT: 235.6 LBS | RESPIRATION RATE: 18 BRPM | SYSTOLIC BLOOD PRESSURE: 118 MMHG

## 2018-09-06 DIAGNOSIS — Z98.84 S/P LAPAROSCOPIC SLEEVE GASTRECTOMY: Primary | ICD-10-CM

## 2018-09-06 PROCEDURE — 99024 POSTOP FOLLOW-UP VISIT: CPT | Performed by: SURGERY

## 2018-09-06 NOTE — PROGRESS NOTES
Dietary Assessment Note    Vitals:   Vitals:    09/06/18 1448   BP: 118/88   Pulse: 104   Resp: 18   Weight: 235 lb 9.6 oz (106.9 kg)   Height: 5' 6\" (1.676 m)   Patient lost 22.4 lbs over 2 months. She has c/o intolerance w/ protein powders. Total Weight Loss: 25.4#    Labs reviewed: labs are reviewed, up to date and normal    Protein intake: 60-80 grams/day most days    Fluid intake: 48-64 oz/day most days, usually ~ 38 oz    Multivitamin/mineral intake: Fusion - how many/day: 4    Calcium intake: none    Other: none    Exercise: walking around the house    Nutrition Assessment: 2 week post-op visit.      Breakfast: 2 Tbsp oatmeal + 2 Tbsp applesauce + cinnamon    Snack: protein shake w/ water     Lunch: 4 Tbsp tuna + britt    Snack: none    Dinner: 4 Tbsp veggie + barley soup    Snack: 4 Tbsp applesauce    30-30-30: yes    Volume: 2-4 Tbsp    Food Intolerances/issues: c/o nausea associated w/ protein shakes    Client Concerns: intolerance to protein/fluids    Goals: advanced to phase 3 diet    Handouts: Phase 3 meal plan    Plan: f/u as directed      Sancho Holloway

## 2018-09-11 NOTE — PROGRESS NOTES
The patient is a 46 y.o. female who returns today for follow up.    Shanita Lema is s/p     Sleeve         We discussed how her weight affects her overall health including:  Patient Active Problem List   Diagnosis    Allergic rhinitis    Chronic migraine    Hypercholesterolemia-ASCVD 1.1%  3/2016    Malignant neoplasm of upper-inner quadrant of left female breast ([address]    Triple negative malignant neoplasm of breast ([address]    Neoplastic malignant related fatigue    Chemotherapy-induced neuropathy ([address]    Malignant neoplasm of left female breast ([address]    Symptomatic mammary hypertrophy    Encounter for chemotherapy management    Lymphedema of breast    Obesity, Class II, BMI 35-39.9, with comorbidity    Arthritis of both knees-xray 2017    Chronic GERD    Mixed hyperlipidemia    Morbid obesity with BMI of 40.0-44.9, adult ([address]    Anesthesia of skin    Carpal tunnel syndrome of right wrist-mild 2018    S/P laparoscopic sleeve gastrectomy        Vitals:    09/06/18 1448   BP: 118/88   Pulse: 104   Resp: 18   Weight: 235 lb 9.6 oz (106.9 kg)   Height: 5' 6\" (1.676 m)       Lab Results   Component Value Date    WBC 9.9 08/23/2018    RBC 3.68 08/23/2018    RBC 4.18 04/19/2017    HGB 11.5 08/23/2018    HCT 34.9 08/23/2018    MCV 94.6 08/23/2018    MCH 31.2 08/23/2018    MCHC 32.9 08/23/2018    MPV 9.4 08/23/2018    NEUTOPHILPCT 61.9 04/03/2018    LYMPHOPCT 27.5 04/03/2018    LYMPHOPCT 27.0 04/19/2017    MONOPCT 8.8 04/03/2018    EOSRELPCT 1.2 04/03/2018    BASOPCT 0.6 04/03/2018    NEUTROABS 2.5 04/03/2018    LYMPHSABS 1.1 04/03/2018    MONOSABS 0.4 04/03/2018    EOSABS 0.0 04/03/2018     Lab Results   Component Value Date     08/23/2018    K 4.5 08/23/2018     08/23/2018    CO2 21 08/23/2018    ANIONGAP 10 08/23/2018    GLUCOSE 96 08/23/2018    GLUCOSE 103 08/31/2016    BUN 8 08/23/2018    CREATININE 0.6 08/23/2018    LABGLOM >60 08/23/2018    GFRAA >60 08/23/2018    CALCIUM 8.7 08/23/2018

## 2018-09-13 ENCOUNTER — TELEPHONE (OUTPATIENT)
Dept: SURGERY | Age: 48
End: 2018-09-13

## 2018-09-13 NOTE — TELEPHONE ENCOUNTER
Called patient left message to schedule appointment w/Dr Karl Lux for a 6 month appt. Asked patient to call back to schedule appointment or please let us know if she is following a different practice.

## 2018-10-05 ENCOUNTER — HOSPITAL ENCOUNTER (OUTPATIENT)
Dept: MAMMOGRAPHY | Age: 48
Discharge: HOME | End: 2018-10-05
Payer: COMMERCIAL

## 2018-10-05 DIAGNOSIS — R92.8 ABNORMAL MAMMOGRAM: ICD-10-CM

## 2018-10-05 PROCEDURE — G0279 TOMOSYNTHESIS, MAMMO: HCPCS

## 2018-10-10 ENCOUNTER — OFFICE VISIT (OUTPATIENT)
Dept: BARIATRICS/WEIGHT MGMT | Age: 48
End: 2018-10-10

## 2018-10-10 VITALS
SYSTOLIC BLOOD PRESSURE: 117 MMHG | BODY MASS INDEX: 36.67 KG/M2 | DIASTOLIC BLOOD PRESSURE: 69 MMHG | HEART RATE: 88 BPM | OXYGEN SATURATION: 98 % | HEIGHT: 66 IN | WEIGHT: 228.2 LBS

## 2018-10-10 DIAGNOSIS — Z98.84 S/P LAPAROSCOPIC SLEEVE GASTRECTOMY: Primary | ICD-10-CM

## 2018-10-10 DIAGNOSIS — E66.01 SEVERE OBESITY (BMI 35.0-35.9 WITH COMORBIDITY) (HCC): ICD-10-CM

## 2018-10-10 DIAGNOSIS — E78.2 MIXED HYPERLIPIDEMIA: ICD-10-CM

## 2018-10-10 DIAGNOSIS — K21.9 CHRONIC GERD: ICD-10-CM

## 2018-10-10 PROCEDURE — 99024 POSTOP FOLLOW-UP VISIT: CPT | Performed by: NURSE PRACTITIONER

## 2018-10-10 ASSESSMENT — ENCOUNTER SYMPTOMS
EYES NEGATIVE: 1
ALLERGIC/IMMUNOLOGIC NEGATIVE: 1
RESPIRATORY NEGATIVE: 1
ROS SKIN COMMENTS: SURGICAL INCISIONS.
GASTROINTESTINAL NEGATIVE: 1

## 2018-10-10 NOTE — PROGRESS NOTES
Dietary Assessment Note    Vitals:   Vitals:    10/10/18 1347   BP: 117/69   Pulse: 88   SpO2: 98%   Weight: 228 lb 3.2 oz (103.5 kg)   Height: 5' 6\" (1.676 m)    Patient lost 7.4 lbs over 5 weeks. Total Weight Loss: 32.8 lbs    Labs reviewed: no lab studies available for review at time of visit    Protein intake: Patient not tracking ~45-50 grams    Fluid intake: 36-48 oz water + decaf tea + crystal light/vitamin water zero    Multivitamin/mineral intake: Fusion - how many/day: 4    Calcium intake: none    Other: Vitamin D3    Exercise: staying active/walking    Nutrition Assessment: 7 weeks post-op visit. Breakfast: 7 AM: Protein shake made w/ 12 oz propel     Snack: 9:30-10 AM: 4 oz Oatmeal made w/ water + applesauce w/ cinnamon OR 1 egg w/ cheese    Lunch: 12:30 PM: 1 oz turkey OR tuna w/ britt/celery/eggs + tomato + pears (from cafeteria)    Snack: 2:30-3 PM: String Cheese + 12 wheat thins OR tuna w/ britt/celery/eggs (from cafeteria)    Dinner: 6-6:30 PM: Pot roast + carrots/potatoes     Snack: SF popsicle     Amount per meal: 2-5 oz     Following 30/30/30 rule: Generally taking 30 minutes.      Food Intolerances/issues: spicy foods    Client Concerns: none    Goals:   - Advance to phase 4 diet   - Increase fluids to 48 oz consistently   - Track intakes: 6155-9060 calories and 60-80 grams protein  - Start [facility] 2 days/week    Plan: Follow up at 12 weeks post op and as needed      [employer]

## 2018-10-10 NOTE — PROGRESS NOTES
[facility]) Physicians   [employer]    Subjective:      Patient ID: Maxime Preciado is a 46 y.o. female    HPI    7 weeks s/p sleeve gastrectomy    Maxime Preciado is a very pleasant 46 y.o. obese female , Body mass index is 36.83 kg/m². Patient denies any nausea, vomiting, fevers, chills, shortness ofbreath, chest pain, constipation or urinary symptoms. Denies any heartburn nor dysphagia.     Past Medical History:   Diagnosis Date    Allergic rhinitis     Anxiety 7/21/2015    Arthritis of both knees-xray 2017 9/26/2017    Cancer ([facility]     breast cancer    Chronic GERD 11/14/2017    Depression     pt states \"situational anxiety\"    Gestational diabetes     History of blood transfusion     Hypercholesterolemia 3/14/2016    no meds    Lymphedema of left arm     S/P LYMPH NODE SURGERY    Migraine     Morbid obesity with BMI of 40.0-44.9, adult ([facility] 4/21/2014    PONV (postoperative nausea and vomiting)     Prediabetes 3/14/2016    Trochanteric bursitis of right hip 4/21/2014     Past Surgical History:   Procedure Laterality Date    BREAST BIOPSY Left     LEFT LUMPECTOMY, NODE DISSECTION    BREAST REDUCTION SURGERY      CHOLECYSTECTOMY  1994    HYSTERECTOMY  2004    HYSTERECTOMY, VAGINAL      LASIK      OTHER SURGICAL HISTORY Right 05/12/2016    right subclavian port-a-cath    ROTATOR CUFF REPAIR Right 2012    SLEEVE GASTRECTOMY  08/22/2018    LAPAROSCOPIC SLEEVE GASTRECTOMY-ETHICON    UPPER GASTROINTESTINAL ENDOSCOPY  02/23/2018     Family History   Problem Relation Age of Onset    Heart Disease Mother     Arthritis Mother     Asthma Mother     Coronary Art Dis Mother     Heart Disease Father     Arthritis Father     Diabetes Father     Coronary Art Dis Father     Depression Father     Hypertension Father     Cancer Sister      Social History   Substance Use Topics    Smoking status: Never Smoker    Smokeless tobacco: Never Used    Alcohol use No     I counseled the patient on the importance of not smoking and risks of ETOH. Allergies   Allergen Reactions    Adhesive Tape      Irritates skin    Percocet [Oxycodone-Acetaminophen] Itching and Nausea And Vomiting    Prozac [Fluoxetine] Nausea And Vomiting     Vitals:    10/10/18 1347   BP: 117/69   Pulse: 88   SpO2: 98%   Weight: 228 lb 3.2 oz (103.5 kg)   Height: 5' 6\" (1.676 m)     Body mass index is 36.83 kg/m². Current Outpatient Prescriptions:     ondansetron (ZOFRAN ODT) 4 MG disintegrating tablet, Take 2 tablets by mouth every 8 hours as needed for Nausea, Disp: 30 tablet, Rfl: 1    nystatin (NYSTATIN) 460603 UNIT/GM powder, APPLY AS DIRECTED TWICE DAILY, Disp: 56.7 g, Rfl: 1    omeprazole (PRILOSEC) 20 MG delayed release capsule, Take 1 capsule by mouth Daily (Patient taking differently: Take 20 mg by mouth nightly ), Disp: 30 capsule, Rfl: 3    ergocalciferol (DRISDOL) 75672 units capsule, 1 cap po twice weekly, Disp: 24 capsule, Rfl: 3    NONFORMULARY, Take 615 mg by mouth daily, Disp: , Rfl:     citalopram (CELEXA) 20 MG tablet, TAKE ONE TABLET BY MOUTH ONE TIME A DAY FOR ANXIETY (Patient taking differently: TAKE ONE TABLET BY MOUTH ONE TIME A DAY FOR ANXIETY TAKES AT HS), Disp: 90 tablet, Rfl: 1    melatonin 3 MG TABS tablet, Take 10 mg by mouth nightly as needed , Disp: , Rfl:     B Complex Vitamins (VITAMIN B COMPLEX PO), Take by mouth, Disp: , Rfl:     fluticasone (FLONASE) 50 MCG/ACT nasal spray, 2 sprays each nostril daily, Disp: 3 Bottle, Rfl: 3    Review of Systems   Constitutional: Negative. HENT: Negative. Eyes: Negative. Respiratory: Negative. Cardiovascular: Negative. Gastrointestinal: Negative. Endocrine: Negative. Genitourinary: Negative. Musculoskeletal: Negative. Skin:        Surgical incisions. Allergic/Immunologic: Negative. Neurological: Negative. Hematological: Negative. Psychiatric/Behavioral: Negative.       Objective:   Physical Exam

## 2018-11-13 RX ORDER — CITALOPRAM 20 MG/1
TABLET ORAL
Qty: 90 TABLET | Refills: 1 | Status: SHIPPED | OUTPATIENT
Start: 2018-11-13 | End: 2020-07-31

## 2018-11-15 ENCOUNTER — OFFICE VISIT (OUTPATIENT)
Dept: BARIATRICS/WEIGHT MGMT | Age: 48
End: 2018-11-15

## 2018-11-15 VITALS
SYSTOLIC BLOOD PRESSURE: 116 MMHG | RESPIRATION RATE: 18 BRPM | HEIGHT: 66 IN | DIASTOLIC BLOOD PRESSURE: 76 MMHG | OXYGEN SATURATION: 98 % | HEART RATE: 84 BPM | WEIGHT: 221 LBS | BODY MASS INDEX: 35.52 KG/M2

## 2018-11-15 DIAGNOSIS — E66.01 SEVERE OBESITY (BMI 35.0-35.9 WITH COMORBIDITY) (HCC): Primary | ICD-10-CM

## 2018-11-15 DIAGNOSIS — Z98.84 S/P LAPAROSCOPIC SLEEVE GASTRECTOMY: ICD-10-CM

## 2018-11-15 PROCEDURE — 99024 POSTOP FOLLOW-UP VISIT: CPT | Performed by: SURGERY

## 2018-11-15 NOTE — PROGRESS NOTES
08/11/2018     Lab Results   Component Value Date    CHOL 253 05/25/2018    TRIG 86 05/25/2018    HDL 59 05/25/2018    LDLCALC 177 05/25/2018    LABVLDL 22 11/15/2017     Lab Results   Component Value Date    TSHREFLEX 2.57 04/03/2018     Lab Results   Component Value Date    IRON 82 11/15/2017    TIBC 310 11/15/2017    LABIRON 26 11/15/2017     Lab Results   Component Value Date    TQWZJGFM74 453 11/15/2017    FOLATE 16.77 11/15/2017     Lab Results   Component Value Date    VITD25 45.9 11/15/2017     Lab Results   Component Value Date    LABA1C 5.4 11/15/2017    .3 11/15/2017         Current Outpatient Prescriptions:     citalopram (CELEXA) 20 MG tablet, TAKE ONE TABLET BY MOUTH ONE TIME A DAY FOR ANXIETY, Disp: 90 tablet, Rfl: 1    nystatin (NYSTATIN) 616282 UNIT/GM powder, APPLY AS DIRECTED TWICE DAILY, Disp: 56.7 g, Rfl: 1    omeprazole (PRILOSEC) 20 MG delayed release capsule, Take 1 capsule by mouth Daily (Patient taking differently: Take 20 mg by mouth nightly ), Disp: 30 capsule, Rfl: 3    ergocalciferol (DRISDOL) 80513 units capsule, 1 cap po twice weekly, Disp: 24 capsule, Rfl: 3    NONFORMULARY, Take 615 mg by mouth daily, Disp: , Rfl:     melatonin 3 MG TABS tablet, Take 10 mg by mouth nightly as needed , Disp: , Rfl:     B Complex Vitamins (VITAMIN B COMPLEX PO), Take by mouth, Disp: , Rfl:     fluticasone (FLONASE) 50 MCG/ACT nasal spray, 2 sprays each nostril daily, Disp: 3 Bottle, Rfl: 3      Review of Systems - History obtained from the patient  General ROS: negative  Psychological ROS: negative  Ophthalmic ROS: negative  Neurological ROS: negative  ENT ROS: negative  Allergy and Immunology ROS: negative  Hematological and Lymphatic ROS: negative  Endocrine ROS: negative  Breast ROS: negative  Respiratory ROS: negative  Cardiovascular ROS: negative  Gastrointestinal ROS:negative  Genito-Urinary ROS: negative  Musculoskeletal ROS: negative   Skin ROS: negative    Physical Exam Vitals Reviewed   Constitutional: Patient is oriented to person, place, and time. Patient appears well-developed and well-nourished. Patient is active and cooperative. Non-toxic appearance. No distress. HENT:   Head: Normocephalic and atraumatic. Head is without abrasion and without laceration. Hair is normal.   Right Ear: External ear normal. No lacerations. No drainage, swelling . Left Ear: External ear normal. No lacerations. No drainage, swelling. Nose: Nose normal. No nose lacerations or nasal deformity. Eyes: Conjunctivae, EOM and lids are normal. Right eye exhibits no discharge. No foreign body present in the right eye. Left eye exhibits no discharge. No foreign body present in the left eye. No scleral icterus. Neck: Trachea normal and normal range of motion. No JVD present. Pulmonary/Chest: Effort normal. No accessory muscle usage or stridor. No apnea. No respiratory distress. Cardiovascular: Normal rate and no JVD. Abdominal: Normal appearance. Patient exhibits no distension. Abdomen is soft, non tender. Musculoskeletal: Normal range of motion. Patient exhibits no edema. Neurological: Patient is alert and oriented to person, place, and time. Patient has normal strength. GCS eye subscore is 4. GCS verbal subscore is 5. GCS motor subscore is 6. Skin: Skin is warm and dry. No abrasion and no rash noted. Patient is not diaphoretic. No cyanosis or erythema. Psychiatric: Patient has a normal mood and affect. Speech is normal and behavior is normal. Cognition and memory are normal.     A/P:    Michelle Olson was seen today for bariatrics post op follow up. Diagnoses and all orders for this visit:    Severe obesity (BMI 35.0-35.9 with comorbidity) ([facility]    S/P laparoscopic sleeve gastrectomy          Kenyon Gutierrez is 46 y.o. female , now with Body mass index is 35.67 kg/m². s/p Sleeve gastrectomy, has lost 7 lbs since last visit, total of 40 lbs weight loss.  The patient underwent dietary

## 2018-11-27 ENCOUNTER — TELEPHONE (OUTPATIENT)
Dept: BARIATRICS/WEIGHT MGMT | Age: 48
End: 2018-11-27

## 2018-11-29 ENCOUNTER — OFFICE VISIT (OUTPATIENT)
Dept: ENT CLINIC | Age: 48
End: 2018-11-29
Payer: COMMERCIAL

## 2018-11-29 VITALS
HEART RATE: 72 BPM | WEIGHT: 227 LBS | RESPIRATION RATE: 16 BRPM | DIASTOLIC BLOOD PRESSURE: 74 MMHG | BODY MASS INDEX: 36.48 KG/M2 | SYSTOLIC BLOOD PRESSURE: 115 MMHG | HEIGHT: 66 IN

## 2018-11-29 DIAGNOSIS — R09.89 GLOBUS PHARYNGEUS: ICD-10-CM

## 2018-11-29 DIAGNOSIS — K21.9 LARYNGOPHARYNGEAL REFLUX (LPR): Primary | ICD-10-CM

## 2018-11-29 DIAGNOSIS — R49.0 DYSPHONIA: ICD-10-CM

## 2018-11-29 DIAGNOSIS — H61.21 IMPACTED CERUMEN OF RIGHT EAR: ICD-10-CM

## 2018-11-29 PROCEDURE — 69210 REMOVE IMPACTED EAR WAX UNI: CPT | Performed by: OTOLARYNGOLOGY

## 2018-11-29 PROCEDURE — 31575 DIAGNOSTIC LARYNGOSCOPY: CPT | Performed by: OTOLARYNGOLOGY

## 2018-11-29 PROCEDURE — 99243 OFF/OP CNSLTJ NEW/EST LOW 30: CPT | Performed by: OTOLARYNGOLOGY

## 2018-11-29 RX ORDER — OMEPRAZOLE 40 MG/1
40 CAPSULE, DELAYED RELEASE ORAL DAILY
Qty: 30 CAPSULE | Refills: 2 | Status: SHIPPED | OUTPATIENT
Start: 2018-11-29 | End: 2019-01-23 | Stop reason: SDUPTHER

## 2018-11-29 RX ORDER — RANITIDINE 300 MG/1
300 TABLET ORAL NIGHTLY
Qty: 30 TABLET | Refills: 3 | Status: SHIPPED | OUTPATIENT
Start: 2018-11-29 | End: 2019-01-24 | Stop reason: SDUPTHER

## 2018-11-29 ASSESSMENT — ENCOUNTER SYMPTOMS
EYE DISCHARGE: 0
STRIDOR: 0
NAUSEA: 0
VOMITING: 0
SINUS PAIN: 0
BACK PAIN: 0
CHOKING: 0
TROUBLE SWALLOWING: 0
COUGH: 0
CONSTIPATION: 0
FACIAL SWELLING: 0
VOICE CHANGE: 1
SINUS PRESSURE: 0
WHEEZING: 0
DIARRHEA: 0
SORE THROAT: 1
BLOOD IN STOOL: 0
PHOTOPHOBIA: 0
SHORTNESS OF BREATH: 0
EYE ITCHING: 0
COLOR CHANGE: 0
RHINORRHEA: 0

## 2018-11-29 NOTE — LETTER
[address] [facility]  [address] [address]  Phone: [phone]  Fax: [phone]    Barbie Mcburney, MD        November 29, 2018       Patient: Kina Shaw   MR Number: [id number]   YOB: 1970   Date of Visit: 11/29/2018       Dear Dr. Nereida Calvillo: Thank you for the request for consultation for Kina Shaw to me for the evaluation of hoarseness, globus sensation. Below are the relevant portions of my assessment and plan of care. If you have questions, please do not hesitate to call me. I look forward to following Aissatou Gray along with you. Sincerely,        Vanesa Mcdonald,     CC providers: Dorise Cranker, MD  [id number]

## 2018-11-29 NOTE — PROGRESS NOTES
[facility]  [address] [address] [facility], [address] [place] [address] [place]  P: [phone]  F: [phone]       Patient     Brittani Bonilla  1970    ChiefComplaint     Chief Complaint   Patient presents with    New Patient       History of Present Illness     Sanju Sims is a pleasant 12-year-old female who presents today for hoarseness and globus sensation since August.  In August she underwent a gastric bypass surgery. Since that she's been having issues with these symptoms. She denies any dysphasia or odynophagia. She denies hemoptysis or otalgia. She denies history of alcohol or tobacco use. She does have a history of acid reflux. She takes Prilosec 20 mg at nighttime. She does admit to intermittent nighttime sore throat. Denies chronic cough or chronic throat clearing. She has some mild seasonal allergy symptoms for which she takes Flonase nasal spray. This typically controls the symptoms.     Past Medical History     Past Medical History:   Diagnosis Date    Allergic rhinitis     Anxiety 7/21/2015    Arthritis of both knees-xray 2017 9/26/2017    Cancer ([facility]     breast cancer    Chronic GERD 11/14/2017    Depression     pt states \"situational anxiety\"    Gestational diabetes     History of blood transfusion     Hypercholesterolemia 3/14/2016    no meds    Lymphedema of left arm     S/P LYMPH NODE SURGERY    Migraine     Morbid obesity with BMI of 40.0-44.9, adult ([address] 4/21/2014    PONV (postoperative nausea and vomiting)     Prediabetes 3/14/2016    Trochanteric bursitis of right hip 4/21/2014       Past Surgical History     Past Surgical History:   Procedure Laterality Date    BREAST BIOPSY Left     LEFT LUMPECTOMY, NODE DISSECTION    BREAST REDUCTION SURGERY      CHOLECYSTECTOMY  1994    HYSTERECTOMY  2004    HYSTERECTOMY, VAGINAL      LASIK      OTHER SURGICAL HISTORY Right 05/12/2016    right subclavian port-a-cath    ROTATOR CUFF REPAIR Right 2012    SLEEVE utilized to visualize the external auditory canals using a 5mm speculum. Cerumen was removed with curettes and lawson suctions on the right side. The tympanic membrane is intact. No fluid visualized in the middle ear. No complications. Assessment and Plan     1. Laryngopharyngeal reflux (LPR)  Patient has findings on laryngoscopy that are associated with laryngopharyngeal reflux. I recommend increasing her Prilosec to 40 mg and also to take it just before dinnertime. Additionally I recommend she take ranitidine at bedtime. She will follow up in 6 weeks to assess improvement. - ranitidine (ZANTAC) 300 MG tablet; Take 1 tablet by mouth nightly  Dispense: 30 tablet; Refill: 3  - omeprazole (PRILOSEC) 40 MG delayed release capsule; Take 1 capsule by mouth daily  Dispense: 30 capsule; Refill: 2    2. Dysphonia  As above    3. Globus pharyngeus  As above    4. Impacted cerumen of right ear  Cerumen noted to be impacted on the right external auditory canal today. Cerumen removed with instruments in the office. Patient reports improvement of pressure and muffled feeling in the ear. Return in about 6 weeks (around 1/10/2019).

## 2019-01-23 DIAGNOSIS — K21.9 LARYNGOPHARYNGEAL REFLUX (LPR): ICD-10-CM

## 2019-01-23 RX ORDER — RANITIDINE 150 MG/1
150 TABLET ORAL 2 TIMES DAILY
Qty: 180 TABLET | Refills: 1 | Status: SHIPPED | OUTPATIENT
Start: 2019-01-23 | End: 2019-05-16

## 2019-01-23 RX ORDER — OMEPRAZOLE 40 MG/1
40 CAPSULE, DELAYED RELEASE ORAL DAILY
Qty: 30 CAPSULE | Refills: 2 | Status: SHIPPED | OUTPATIENT
Start: 2019-01-23 | End: 2019-05-16 | Stop reason: SDUPTHER

## 2019-01-24 DIAGNOSIS — K21.9 LARYNGOPHARYNGEAL REFLUX (LPR): ICD-10-CM

## 2019-01-24 RX ORDER — RANITIDINE 300 MG/1
300 TABLET ORAL NIGHTLY
Qty: 30 TABLET | Refills: 3 | Status: SHIPPED | OUTPATIENT
Start: 2019-01-24 | End: 2019-05-16

## 2019-02-21 ENCOUNTER — OFFICE VISIT (OUTPATIENT)
Dept: BARIATRICS/WEIGHT MGMT | Age: 49
End: 2019-02-21
Payer: COMMERCIAL

## 2019-02-21 VITALS
BODY MASS INDEX: 31.1 KG/M2 | HEART RATE: 79 BPM | HEIGHT: 66 IN | DIASTOLIC BLOOD PRESSURE: 68 MMHG | WEIGHT: 193.5 LBS | OXYGEN SATURATION: 98 % | SYSTOLIC BLOOD PRESSURE: 109 MMHG | RESPIRATION RATE: 18 BRPM

## 2019-02-21 DIAGNOSIS — K21.9 CHRONIC GERD: ICD-10-CM

## 2019-02-21 DIAGNOSIS — E78.2 MIXED HYPERLIPIDEMIA: ICD-10-CM

## 2019-02-21 DIAGNOSIS — Z98.84 S/P LAPAROSCOPIC SLEEVE GASTRECTOMY: Primary | ICD-10-CM

## 2019-02-21 PROCEDURE — 99213 OFFICE O/P EST LOW 20 MIN: CPT | Performed by: SURGERY

## 2019-02-25 ENCOUNTER — HOSPITAL ENCOUNTER (OUTPATIENT)
Age: 49
Discharge: HOME | End: 2019-02-25
Payer: COMMERCIAL

## 2019-02-25 DIAGNOSIS — K21.9 CHRONIC GERD: ICD-10-CM

## 2019-02-25 DIAGNOSIS — Z98.84 S/P LAPAROSCOPIC SLEEVE GASTRECTOMY: ICD-10-CM

## 2019-02-25 DIAGNOSIS — E78.2 MIXED HYPERLIPIDEMIA: ICD-10-CM

## 2019-02-25 PROCEDURE — 82306 VITAMIN D 25 HYDROXY: CPT

## 2019-02-25 PROCEDURE — 83036 HEMOGLOBIN GLYCOSYLATED A1C: CPT

## 2019-02-25 PROCEDURE — 84590 ASSAY OF VITAMIN A: CPT

## 2019-02-25 PROCEDURE — 82607 VITAMIN B-12: CPT

## 2019-02-25 PROCEDURE — 83540 ASSAY OF IRON: CPT

## 2019-02-25 PROCEDURE — 85025 COMPLETE CBC W/AUTO DIFF WBC: CPT

## 2019-02-25 PROCEDURE — 80061 LIPID PANEL: CPT

## 2019-02-25 PROCEDURE — 83550 IRON BINDING TEST: CPT

## 2019-02-25 PROCEDURE — 84425 ASSAY OF VITAMIN B-1: CPT

## 2019-02-25 PROCEDURE — 84597 ASSAY OF VITAMIN K: CPT

## 2019-02-25 PROCEDURE — 36415 COLL VENOUS BLD VENIPUNCTURE: CPT

## 2019-02-25 PROCEDURE — 82746 ASSAY OF FOLIC ACID SERUM: CPT

## 2019-02-25 PROCEDURE — 84443 ASSAY THYROID STIM HORMONE: CPT

## 2019-02-25 PROCEDURE — 84446 ASSAY OF VITAMIN E: CPT

## 2019-02-25 PROCEDURE — 80053 COMPREHEN METABOLIC PANEL: CPT

## 2019-02-26 LAB
A/G RATIO: 1.3 (ref 1.1–2.2)
ALBUMIN SERPL-MCNC: 3.9 G/DL (ref 3.4–5)
ALP BLD-CCNC: 73 U/L (ref 40–129)
ALT SERPL-CCNC: 9 U/L (ref 10–40)
ANION GAP SERPL CALCULATED.3IONS-SCNC: 13 MMOL/L (ref 3–16)
AST SERPL-CCNC: 14 U/L (ref 15–37)
BASOPHILS ABSOLUTE: 0 K/UL (ref 0–0.2)
BASOPHILS RELATIVE PERCENT: 0.6 %
BILIRUB SERPL-MCNC: 0.5 MG/DL (ref 0–1)
BUN BLDV-MCNC: 9 MG/DL (ref 7–20)
CALCIUM SERPL-MCNC: 9.5 MG/DL (ref 8.3–10.6)
CHLORIDE BLD-SCNC: 103 MMOL/L (ref 99–110)
CHOLESTEROL, TOTAL: 278 MG/DL (ref 0–199)
CO2: 28 MMOL/L (ref 21–32)
CREAT SERPL-MCNC: 0.5 MG/DL (ref 0.6–1.1)
EOSINOPHILS ABSOLUTE: 0.1 K/UL (ref 0–0.6)
EOSINOPHILS RELATIVE PERCENT: 3.2 %
ESTIMATED AVERAGE GLUCOSE: 108.3 MG/DL
FOLATE: >20 NG/ML (ref 4.78–24.2)
GFR AFRICAN AMERICAN: >60
GFR NON-AFRICAN AMERICAN: >60
GLOBULIN: 2.9 G/DL
GLUCOSE BLD-MCNC: 81 MG/DL (ref 70–99)
HBA1C MFR BLD: 5.4 %
HCT VFR BLD CALC: 36.6 % (ref 36–48)
HDLC SERPL-MCNC: 47 MG/DL (ref 40–60)
HEMOGLOBIN: 12.5 G/DL (ref 12–16)
IRON SATURATION: 41 % (ref 15–50)
IRON: 83 UG/DL (ref 37–145)
LDL CHOLESTEROL CALCULATED: 214 MG/DL
LYMPHOCYTES ABSOLUTE: 1.3 K/UL (ref 1–5.1)
LYMPHOCYTES RELATIVE PERCENT: 34 %
MCH RBC QN AUTO: 32.3 PG (ref 26–34)
MCHC RBC AUTO-ENTMCNC: 34.1 G/DL (ref 31–36)
MCV RBC AUTO: 94.7 FL (ref 80–100)
MONOCYTES ABSOLUTE: 0.3 K/UL (ref 0–1.3)
MONOCYTES RELATIVE PERCENT: 8.8 %
NEUTROPHILS ABSOLUTE: 2 K/UL (ref 1.7–7.7)
NEUTROPHILS RELATIVE PERCENT: 53.4 %
PDW BLD-RTO: 12.8 % (ref 12.4–15.4)
PLATELET # BLD: 205 K/UL (ref 135–450)
PLATELET SLIDE REVIEW: ABNORMAL
PMV BLD AUTO: 10.7 FL (ref 5–10.5)
POTASSIUM SERPL-SCNC: 3.8 MMOL/L (ref 3.5–5.1)
RBC # BLD: 3.87 M/UL (ref 4–5.2)
SLIDE REVIEW: ABNORMAL
SODIUM BLD-SCNC: 144 MMOL/L (ref 136–145)
TOTAL IRON BINDING CAPACITY: 203 UG/DL (ref 260–445)
TOTAL PROTEIN: 6.8 G/DL (ref 6.4–8.2)
TRIGL SERPL-MCNC: 85 MG/DL (ref 0–150)
TSH REFLEX: 2.18 UIU/ML (ref 0.27–4.2)
VITAMIN B-12: 637 PG/ML (ref 211–911)
VITAMIN D 25-HYDROXY: 79.3 NG/ML
VLDLC SERPL CALC-MCNC: 17 MG/DL
WBC # BLD: 3.8 K/UL (ref 4–11)

## 2019-02-28 LAB
ALPHA-TOCOPHEROL: 13.1 MG/L (ref 5.5–18)
GAMMA-TOCOPHEROL: 1.8 MG/L (ref 0–6)
RETINYL PALMITATE: <0.02 MG/L (ref 0–0.1)
VITAMIN A LEVEL: 0.32 MG/L (ref 0.3–1.2)
VITAMIN A, INTERP: NORMAL
VITAMIN B1 WHOLE BLOOD: 98 NMOL/L (ref 70–180)
VITAMIN K1: 0.58 NMOL/L (ref 0.22–4.88)

## 2019-04-15 ENCOUNTER — HOSPITAL ENCOUNTER (OUTPATIENT)
Dept: MAMMOGRAPHY | Age: 49
Discharge: HOME | End: 2019-04-15
Payer: COMMERCIAL

## 2019-04-15 DIAGNOSIS — Z98.890 STATUS POST LEFT BREAST BIOPSY: ICD-10-CM

## 2019-04-15 PROCEDURE — G0279 TOMOSYNTHESIS, MAMMO: HCPCS

## 2019-05-15 ENCOUNTER — ANESTHESIA EVENT (OUTPATIENT)
Dept: ENDOSCOPY | Age: 49
End: 2019-05-15
Payer: COMMERCIAL

## 2019-05-16 ENCOUNTER — OFFICE VISIT (OUTPATIENT)
Dept: BARIATRICS/WEIGHT MGMT | Age: 49
End: 2019-05-16
Payer: COMMERCIAL

## 2019-05-16 VITALS
OXYGEN SATURATION: 98 % | DIASTOLIC BLOOD PRESSURE: 65 MMHG | RESPIRATION RATE: 18 BRPM | WEIGHT: 179.5 LBS | HEART RATE: 75 BPM | SYSTOLIC BLOOD PRESSURE: 102 MMHG | HEIGHT: 66 IN | BODY MASS INDEX: 28.85 KG/M2

## 2019-05-16 DIAGNOSIS — K21.9 LARYNGOPHARYNGEAL REFLUX (LPR): ICD-10-CM

## 2019-05-16 DIAGNOSIS — Z98.84 S/P LAPAROSCOPIC SLEEVE GASTRECTOMY: Primary | ICD-10-CM

## 2019-05-16 DIAGNOSIS — E78.2 MIXED HYPERLIPIDEMIA: ICD-10-CM

## 2019-05-16 DIAGNOSIS — K21.9 CHRONIC GERD: ICD-10-CM

## 2019-05-16 PROCEDURE — 99213 OFFICE O/P EST LOW 20 MIN: CPT | Performed by: SURGERY

## 2019-05-16 RX ORDER — OMEPRAZOLE 20 MG/1
20 CAPSULE, DELAYED RELEASE ORAL DAILY
Qty: 30 CAPSULE | Refills: 2 | Status: SHIPPED | OUTPATIENT
Start: 2019-05-16 | End: 2020-07-31

## 2019-05-16 NOTE — PROGRESS NOTES
Dietary Assessment Note    Vitals:   Vitals:    05/16/19 1509   BP: 102/65   Pulse: 75   Resp: 18   SpO2: 98%   Weight: 179 lb 8 oz (81.4 kg)   Height: 5' 6\" (1.676 m)    Patient lost 14 lbs over the past 3 months. Total Weight Loss: 81.5lbs    Labs reviewed:  no lab studies available for review at time of visit    Protein intake: 60-80 grams/day     Fluid intake: 48-64 oz/day    Multivitamin/mineral intake: mvi 2 per day    Calcium intake: citracal petite 2 per day    Other: n/a    Exercise: Yes. How much: 2x week. What kind: taking stairs or going to gym    Nutrition Assessment: Brkst is hb egg OR brkst burrito and 1/2 yogurt; 1/2 yogurt for snack OR P3 pack - nuts, cranberries, cheese; lunch is small salad with chix OR 1/4 of turkey wrap; snack is same as above OR beef jerky; dinner is salmon, 1/4 cup scalloped, 1/4 c green beans. Pt recently developed acid reflux? ? May be a stomach bug??     Amount able to eat per sitting: 3oz protein, 1/2 cup veg    Following 30/30/30 rule: yes    Food Intolerances/issues: none    Client Concerns: acid reflux recently    Goals: continue meal plan as presented; increase exercise as tolerated    Plan: follow up as needed    Eliud Hernandez

## 2019-05-16 NOTE — PROGRESS NOTES
[facility]) Physicians   [employer] [facility] [employer]  Carlos Johnson MD, [facility], [address] [place] [address]    [place]  [id number] . Phone: [phone]  Fax: [phone]          Chief Complaint   Patient presents with   [facility] Post Op Follow Up     9 mo s/p sleeve           HPI:     Teofilo Rogers is a very pleasant 46 y.o.  female , Body mass index is 28.97 kg/m². Wendy Chinyere And multiple medical problems who is presenting for bariatric follow up care. Kimber Wallis is s/p laparoscopic sleeve gastrectomy by me 8/2018. Initial Weight: 260.5 lbs, Weight Loss: 81.5 lbs   Comes today to the clinic without any complaints. Patient denies any nausea, vomiting, fevers, chills, shortness of breath, chest pain, constipation or urinary symptoms. Denies any heartburn nor dysphagia. Kimber Wallis is feeling very well, and is very active. Patient is very pleased with the weight loss and resolution of co-morbid conditions.       Pain Assessment   Denies any abdominal pain     Past Medical History:   Diagnosis Date    Allergic rhinitis     Anxiety 7/21/2015    Arthritis of both knees-xray 2017 9/26/2017    Cancer ([address]     breast cancer    Chronic GERD 11/14/2017    Depression     pt states \"situational anxiety\"    Gestational diabetes     History of blood transfusion     Hypercholesterolemia 3/14/2016    no meds    Lymphedema of left arm     S/P LYMPH NODE SURGERY    Migraine     Morbid obesity with BMI of 40.0-44.9, adult ([address] 4/21/2014    PONV (postoperative nausea and vomiting)     Prediabetes 3/14/2016    Trochanteric bursitis of right hip 4/21/2014     Past Surgical History:   Procedure Laterality Date    BREAST BIOPSY Left     LEFT LUMPECTOMY, NODE DISSECTION    BREAST REDUCTION SURGERY      CHOLECYSTECTOMY  1994    HYSTERECTOMY  2004    HYSTERECTOMY, VAGINAL      LASIK      OTHER SURGICAL HISTORY Right 05/12/2016    right subclavian port-a-cath    ROTATOR CUFF REPAIR Right 2012    SLEEVE GASTRECTOMY  08/22/2018    LAPAROSCOPIC SLEEVE GASTRECTOMY-ETHICON    UPPER GASTROINTESTINAL ENDOSCOPY  02/23/2018     Family History   Problem Relation Age of Onset    Heart Disease Mother     Arthritis Mother     Asthma Mother     Coronary Art Dis Mother     Heart Disease Father     Arthritis Father     Diabetes Father     Coronary Art Dis Father     Depression Father     Hypertension Father     Cancer Sister      Social History     Tobacco Use    Smoking status: Never Smoker    Smokeless tobacco: Never Used   Substance Use Topics    Alcohol use: No     I counseled the patient on the importance of not smoking and risks of ETOH. Allergies   Allergen Reactions    Adhesive Tape      Irritates skin    Percocet [Oxycodone-Acetaminophen] Itching and Nausea And Vomiting    Prozac [Fluoxetine] Nausea And Vomiting     Vitals:    05/16/19 1509   BP: 102/65   Pulse: 75   Resp: 18   SpO2: 98%   Weight: 179 lb 8 oz (81.4 kg)   Height: 5' 6\" (1.676 m)       Body mass index is 28.97 kg/m².       Lab Results   Component Value Date    WBC 3.8 02/25/2019    RBC 3.87 02/25/2019    RBC 4.18 04/19/2017    HGB 12.5 02/25/2019    HCT 36.6 02/25/2019    MCV 94.7 02/25/2019    MCH 32.3 02/25/2019    MCHC 34.1 02/25/2019    MPV 10.7 02/25/2019    NEUTOPHILPCT 53.4 02/25/2019    LYMPHOPCT 34.0 02/25/2019    LYMPHOPCT 27.0 04/19/2017    MONOPCT 8.8 02/25/2019    EOSRELPCT 3.2 02/25/2019    BASOPCT 0.6 02/25/2019    NEUTROABS 2.0 02/25/2019    LYMPHSABS 1.3 02/25/2019    MONOSABS 0.3 02/25/2019    EOSABS 0.1 02/25/2019     Lab Results   Component Value Date     02/25/2019    K 3.8 02/25/2019    K 4.5 08/23/2018     02/25/2019    CO2 28 02/25/2019    ANIONGAP 13 02/25/2019    GLUCOSE 81 02/25/2019    GLUCOSE 103 08/31/2016    BUN 9 02/25/2019    CREATININE 0.5 02/25/2019    LABGLOM >60 02/25/2019    GFRAA >60 02/25/2019    CALCIUM 9.5 02/25/2019    PROT 6.8 02/25/2019    PROT 6.8 08/31/2016    LABALBU 3.9 02/25/2019    AGRATIO 1.3 02/25/2019    BILITOT 0.5 02/25/2019    ALKPHOS 73 02/25/2019    ALT 9 02/25/2019    AST 14 02/25/2019    GLOB 2.9 02/25/2019     Lab Results   Component Value Date    CHOL 278 02/25/2019    TRIG 85 02/25/2019    HDL 47 02/25/2019    LDLCALC 214 02/25/2019    LABVLDL 17 02/25/2019     Lab Results   Component Value Date    TSHREFLEX 2.18 02/25/2019     Lab Results   Component Value Date    IRON 83 02/25/2019    TIBC 203 02/25/2019    LABIRON 41 02/25/2019     Lab Results   Component Value Date    BDYQTIUW43 637 02/25/2019    FOLATE >20.00 02/25/2019     Lab Results   Component Value Date    VITD25 79.3 02/25/2019     Lab Results   Component Value Date    LABA1C 5.4 02/25/2019    .3 02/25/2019         Current Outpatient Medications:     omeprazole (PRILOSEC) 20 MG delayed release capsule, Take 1 capsule by mouth daily, Disp: 30 capsule, Rfl: 2    citalopram (CELEXA) 20 MG tablet, TAKE ONE TABLET BY MOUTH ONE TIME A DAY FOR ANXIETY, Disp: 90 tablet, Rfl: 1    nystatin (NYSTATIN) 382488 UNIT/GM powder, APPLY AS DIRECTED TWICE DAILY, Disp: 56.7 g, Rfl: 1    ergocalciferol (DRISDOL) 65122 units capsule, 1 cap po twice weekly, Disp: 24 capsule, Rfl: 3    NONFORMULARY, Take 615 mg by mouth daily, Disp: , Rfl:     melatonin 3 MG TABS tablet, Take 10 mg by mouth nightly as needed , Disp: , Rfl:     B Complex Vitamins (VITAMIN B COMPLEX PO), Take by mouth, Disp: , Rfl:     fluticasone (FLONASE) 50 MCG/ACT nasal spray, 2 sprays each nostril daily, Disp: 3 Bottle, Rfl: 3      Review of Systems - History obtained from the patient  General ROS: negative  Psychological ROS: negative  Ophthalmic ROS: negative  Neurological ROS: negative  ENT ROS: negative  Allergy and Immunology ROS: negative  Hematological and Lymphatic ROS: negative  Endocrine ROS: negative  Breast ROS: negative  Respiratory ROS: negative  Cardiovascular ROS: negative  Gastrointestinal ROS:negative  Genito-Urinary ROS: daily          Christina Gomes is 46 y.o. female , now with Body mass index is 28.97 kg/m². s/p Sleeve gastrectomy, has lost 14 lbs since last visit, total of 81.5 lbs weight loss. The patient underwent dietary counseling with registered dietician. I have reviewed, discussed and agree with the dietary plan. Patient is trying hard to keep good dietary and behavior modifications. Patient is monitoring portion sizes, food choices and liquid calories. Patient is trying to exercise regularly. Patient pleased with the surgery outcomes. We discussed how her weight affects her overall health including:  Patient Active Problem List   Diagnosis    Allergic rhinitis    Chronic migraine    Hypercholesterolemia-ASCVD 1.1%  3/2016    Malignant neoplasm of upper-inner quadrant of left female breast ([address]    Triple negative malignant neoplasm of breast ([address]    Neoplastic malignant related fatigue    Chemotherapy-induced neuropathy ([address]    Malignant neoplasm of left female breast ([address]    Symptomatic mammary hypertrophy    Encounter for chemotherapy management    Lymphedema of breast    Obesity, Class II, BMI 35-39.9, with comorbidity    Arthritis of both knees-xray 2017    Chronic GERD    Mixed hyperlipidemia    Morbid obesity with BMI of 40.0-44.9, adult ([address]    Anesthesia of skin    Carpal tunnel syndrome of right wrist-mild 2018    S/P laparoscopic sleeve gastrectomy    Severe obesity (BMI 35.0-35.9 with comorbidity) ([address]    and importance of weight loss to alleviate those co morbid conditions. I encouraged the patient to continue exercise and keeping healthy eating habits. Also counseled the patient extensively on post surgery care. I spent a total of 15 minutes face to face with the patient and greater than 50% of the time was spent in counseling, answering questions, addressing concerns, reviewing labs and/or other studies with the patient as well as coordinating care.       Jaylon Dunbar is doing very well overall, started to have some upper Gi upset last few days, will try Prilosec and see if at helps her or not. RTC in 3 months  Healthy Diet and Exercise      Patient advised that its their responsibility to follow up for studies, referrals and/or labs ordered today.

## 2019-05-28 ENCOUNTER — HOSPITAL ENCOUNTER (OUTPATIENT)
Age: 49
Setting detail: OUTPATIENT SURGERY
Discharge: HOME | End: 2019-05-28
Attending: INTERNAL MEDICINE | Admitting: INTERNAL MEDICINE
Payer: COMMERCIAL

## 2019-05-28 ENCOUNTER — ANESTHESIA (OUTPATIENT)
Dept: ENDOSCOPY | Age: 49
End: 2019-05-28
Payer: COMMERCIAL

## 2019-05-28 VITALS
TEMPERATURE: 97.8 F | DIASTOLIC BLOOD PRESSURE: 72 MMHG | SYSTOLIC BLOOD PRESSURE: 103 MMHG | WEIGHT: 180 LBS | OXYGEN SATURATION: 100 % | HEIGHT: 66 IN | BODY MASS INDEX: 28.93 KG/M2 | RESPIRATION RATE: 16 BRPM | HEART RATE: 84 BPM

## 2019-05-28 VITALS — SYSTOLIC BLOOD PRESSURE: 100 MMHG | DIASTOLIC BLOOD PRESSURE: 73 MMHG | OXYGEN SATURATION: 100 %

## 2019-05-28 PROCEDURE — 3700000001 HC ADD 15 MINUTES (ANESTHESIA): Performed by: INTERNAL MEDICINE

## 2019-05-28 PROCEDURE — 2580000003 HC RX 258: Performed by: FAMILY MEDICINE

## 2019-05-28 PROCEDURE — 7100000010 HC PHASE II RECOVERY - FIRST 15 MIN: Performed by: INTERNAL MEDICINE

## 2019-05-28 PROCEDURE — 2709999900 HC NON-CHARGEABLE SUPPLY: Performed by: INTERNAL MEDICINE

## 2019-05-28 PROCEDURE — 6360000002 HC RX W HCPCS: Performed by: NURSE ANESTHETIST, CERTIFIED REGISTERED

## 2019-05-28 PROCEDURE — 2500000003 HC RX 250 WO HCPCS: Performed by: NURSE ANESTHETIST, CERTIFIED REGISTERED

## 2019-05-28 PROCEDURE — 3609010600 HC COLONOSCOPY POLYPECTOMY SNARE/COLD BIOPSY: Performed by: INTERNAL MEDICINE

## 2019-05-28 PROCEDURE — 3700000000 HC ANESTHESIA ATTENDED CARE: Performed by: INTERNAL MEDICINE

## 2019-05-28 PROCEDURE — 7100000011 HC PHASE II RECOVERY - ADDTL 15 MIN: Performed by: INTERNAL MEDICINE

## 2019-05-28 RX ORDER — SODIUM CHLORIDE 0.9 % (FLUSH) 0.9 %
10 SYRINGE (ML) INJECTION EVERY 12 HOURS SCHEDULED
Status: DISCONTINUED | OUTPATIENT
Start: 2019-05-28 | End: 2019-05-28 | Stop reason: HOSPADM

## 2019-05-28 RX ORDER — SODIUM CHLORIDE 9 MG/ML
INJECTION, SOLUTION INTRAVENOUS CONTINUOUS
Status: DISCONTINUED | OUTPATIENT
Start: 2019-05-28 | End: 2019-05-28 | Stop reason: HOSPADM

## 2019-05-28 RX ORDER — PROPOFOL 10 MG/ML
INJECTION, EMULSION INTRAVENOUS PRN
Status: DISCONTINUED | OUTPATIENT
Start: 2019-05-28 | End: 2019-05-28 | Stop reason: SDUPTHER

## 2019-05-28 RX ORDER — LIDOCAINE HYDROCHLORIDE 20 MG/ML
INJECTION, SOLUTION INFILTRATION; PERINEURAL PRN
Status: DISCONTINUED | OUTPATIENT
Start: 2019-05-28 | End: 2019-05-28 | Stop reason: SDUPTHER

## 2019-05-28 RX ORDER — SODIUM CHLORIDE 0.9 % (FLUSH) 0.9 %
10 SYRINGE (ML) INJECTION PRN
Status: DISCONTINUED | OUTPATIENT
Start: 2019-05-28 | End: 2019-05-28 | Stop reason: HOSPADM

## 2019-05-28 RX ADMIN — PROPOFOL 20 MG: 10 INJECTION, EMULSION INTRAVENOUS at 08:22

## 2019-05-28 RX ADMIN — PHENYLEPHRINE HYDROCHLORIDE 50 MCG: 10 INJECTION INTRAVENOUS at 08:27

## 2019-05-28 RX ADMIN — PROPOFOL 10 MG: 10 INJECTION, EMULSION INTRAVENOUS at 08:25

## 2019-05-28 RX ADMIN — LIDOCAINE HYDROCHLORIDE 80 MG: 20 INJECTION, SOLUTION INFILTRATION; PERINEURAL at 08:20

## 2019-05-28 RX ADMIN — PHENYLEPHRINE HYDROCHLORIDE 50 MCG: 10 INJECTION INTRAVENOUS at 08:32

## 2019-05-28 RX ADMIN — PROPOFOL 10 MG: 10 INJECTION, EMULSION INTRAVENOUS at 08:26

## 2019-05-28 RX ADMIN — PHENYLEPHRINE HYDROCHLORIDE 50 MCG: 10 INJECTION INTRAVENOUS at 08:22

## 2019-05-28 RX ADMIN — PROPOFOL 100 MG: 10 INJECTION, EMULSION INTRAVENOUS at 08:20

## 2019-05-28 RX ADMIN — PROPOFOL 10 MG: 10 INJECTION, EMULSION INTRAVENOUS at 08:27

## 2019-05-28 RX ADMIN — PROPOFOL 10 MG: 10 INJECTION, EMULSION INTRAVENOUS at 08:30

## 2019-05-28 RX ADMIN — PROPOFOL 10 MG: 10 INJECTION, EMULSION INTRAVENOUS at 08:29

## 2019-05-28 RX ADMIN — SODIUM CHLORIDE: 9 INJECTION, SOLUTION INTRAVENOUS at 07:42

## 2019-05-28 RX ADMIN — PROPOFOL 20 MG: 10 INJECTION, EMULSION INTRAVENOUS at 08:21

## 2019-05-28 RX ADMIN — PROPOFOL 10 MG: 10 INJECTION, EMULSION INTRAVENOUS at 08:24

## 2019-05-28 ASSESSMENT — PAIN - FUNCTIONAL ASSESSMENT: PAIN_FUNCTIONAL_ASSESSMENT: 0-10

## 2019-05-28 ASSESSMENT — PAIN SCALES - GENERAL: PAINLEVEL_OUTOF10: 0

## 2019-05-28 NOTE — ANESTHESIA PRE PROCEDURE
Department of Anesthesiology  Preprocedure Note       Name:  Yusuf Thai   Age:  46 y.o.  :  1970                                          MRN:  [id number]         Date:  2019      Surgeon: Lang Ventura):  Teofilo Braga MD    Procedure: COLONOSCOPY (N/A )    Medications prior to admission:   Prior to Admission medications    Medication Sig Start Date End Date Taking? Authorizing Provider   omeprazole (PRILOSEC) 20 MG delayed release capsule Take 1 capsule by mouth daily 5/16/19 6/15/19  Remington Arreaga MD   citalopram (CELEXA) 20 MG tablet TAKE ONE TABLET BY MOUTH ONE TIME A DAY FOR ANXIETY 18   Lily Wen MD   nystatin (NYSTATIN) 523887 UNIT/GM powder APPLY AS DIRECTED TWICE DAILY 18   Merly Ca MD   ergocalciferol (DRISDOL) 48790 units capsule 1 cap po twice weekly 18   Merly Ca MD   NONFORMULARY Take 615 mg by mouth daily    Historical Provider, MD   melatonin 3 MG TABS tablet Take 10 mg by mouth nightly as needed     Historical Provider, MD   B Complex Vitamins (VITAMIN B COMPLEX PO) Take by mouth    Historical Provider, MD   fluticasone Eladio Check) 50 MCG/ACT nasal spray 2 sprays each nostril daily 3/11/16   Merly Ca MD       Current medications:    No current facility-administered medications for this encounter. Allergies:     Allergies   Allergen Reactions    Adhesive Tape      Irritates skin    Percocet [Oxycodone-Acetaminophen] Itching and Nausea And Vomiting    Prozac [Fluoxetine] Nausea And Vomiting       Problem List:    Patient Active Problem List   Diagnosis Code    Allergic rhinitis J30.9    Chronic migraine G43.709    Hypercholesterolemia-ASCVD 1.1%  3/2016 E78.00    Malignant neoplasm of upper-inner quadrant of left female breast [address] C50.212    Triple negative malignant neoplasm of breast ([address] C50.919    Neoplastic malignant related fatigue R53.0    Chemotherapy-induced neuropathy ([facility] G62.0, Answered      Vital Signs (Current): There were no vitals filed for this visit. BP Readings from Last 3 Encounters:   05/16/19 102/65   02/21/19 109/68   11/29/18 115/74       NPO Status:                                                                                 BMI:   Wt Readings from Last 3 Encounters:   05/16/19 179 lb 8 oz (81.4 kg)   02/21/19 193 lb 8 oz (87.8 kg)   11/29/18 227 lb (103 kg)     There is no height or weight on file to calculate BMI.    CBC:   Lab Results   Component Value Date    WBC 3.8 02/25/2019    RBC 3.87 02/25/2019    RBC 4.18 04/19/2017    HGB 12.5 02/25/2019    HCT 36.6 02/25/2019    MCV 94.7 02/25/2019    RDW 12.8 02/25/2019     02/25/2019       CMP:   Lab Results   Component Value Date     02/25/2019    K 3.8 02/25/2019    K 4.5 08/23/2018     02/25/2019    CO2 28 02/25/2019    BUN 9 02/25/2019    CREATININE 0.5 02/25/2019    GFRAA >60 02/25/2019    AGRATIO 1.3 02/25/2019    LABGLOM >60 02/25/2019    GLUCOSE 81 02/25/2019    GLUCOSE 103 08/31/2016    PROT 6.8 02/25/2019    PROT 6.8 08/31/2016    CALCIUM 9.5 02/25/2019    BILITOT 0.5 02/25/2019    ALKPHOS 73 02/25/2019    AST 14 02/25/2019    ALT 9 02/25/2019       POC Tests: No results for input(s): POCGLU, POCNA, POCK, POCCL, POCBUN, POCHEMO, POCHCT in the last 72 hours. Coags: No results found for: PROTIME, INR, APTT    HCG (If Applicable):   Lab Results   Component Value Date    PREGTESTUR Negative 12/12/2016        ABGs: No results found for: PHART, PO2ART, QVG1TII, NGF2FQJ, BEART, Y6ZRLFSW     Type & Screen (If Applicable):  [facility] results found [facility]: [facility]    Anesthesia Evaluation  Patient summary reviewed and Nursing notes reviewed   history of anesthetic complications: PONV.   Airway: Mallampati: II  TM distance: >3 FB   Neck ROM: full  Mouth opening: > = 3 FB Dental:          Pulmonary:                              Cardiovascular: Neuro/Psych:   (+) neuromuscular disease:, headaches:, psychiatric history:depression/anxiety             GI/Hepatic/Renal:   (+) GERD: well controlled,           Endo/Other:    (+) DiabetesType II DM, well controlled, , .                  ROS comment:   Left breast cancer. Mastectomy and radiation Abdominal:           Vascular:                                      Anesthesia Plan      general     ASA 2       Induction: intravenous. MIPS: Prophylactic antiemetics administered. Anesthetic plan and risks discussed with patient. Plan discussed with CRNA.     Attending anesthesiologist reviewed and agrees with Erika Weeks MD   5/28/2019

## 2019-05-28 NOTE — PROGRESS NOTES
Name:  Marvel Carbone  Age:  46 y.o.  CSN:  [id number]            Past Medical History:        Diagnosis Date    Allergic rhinitis     Anxiety 7/21/2015    Arthritis of both knees-xray 2017 9/26/2017    Cancer ([facility]     breast cancer left    Chronic GERD 11/14/2017    Depression     pt states \"situational anxiety\"    Gestational diabetes     History of blood transfusion     Hypercholesterolemia 3/14/2016    no meds    Lymphedema of left arm     S/P LYMPH NODE SURGERY    Migraine     Morbid obesity with BMI of 40.0-44.9, adult ([facility] 4/21/2014    PONV (postoperative nausea and vomiting)     Prediabetes 3/14/2016    Trochanteric bursitis of right hip 4/21/2014       Past Surgical History:      Procedure Laterality Date    BREAST BIOPSY Left     LEFT LUMPECTOMY, NODE DISSECTION    BREAST REDUCTION SURGERY      CHOLECYSTECTOMY  1994    HYSTERECTOMY  2004    HYSTERECTOMY, VAGINAL      LASIK      OTHER SURGICAL HISTORY Right 05/12/2016    right subclavian port-a-cath; removed    ROTATOR CUFF REPAIR Right 2012    SLEEVE GASTRECTOMY  08/22/2018    LAPAROSCOPIC SLEEVE GASTRECTOMY-ETHICON    UPPER GASTROINTESTINAL ENDOSCOPY  02/23/2018       Medications Prior to Admission:  Medications Prior to Admission: omeprazole (PRILOSEC) 20 MG delayed release capsule, Take 1 capsule by mouth daily  citalopram (CELEXA) 20 MG tablet, TAKE ONE TABLET BY MOUTH ONE TIME A DAY FOR ANXIETY  ergocalciferol (DRISDOL) 74486 units capsule, 1 cap po twice weekly  NONFORMULARY, Take 615 mg by mouth daily  melatonin 3 MG TABS tablet, Take 10 mg by mouth nightly as needed   B Complex Vitamins (VITAMIN B COMPLEX PO), Take by mouth  fluticasone (FLONASE) 50 MCG/ACT nasal spray, 2 sprays each nostril daily  nystatin (NYSTATIN) 665897 UNIT/GM powder, APPLY AS DIRECTED TWICE DAILY    Allergies:  Adhesive tape; Percocet [oxycodone-acetaminophen]; and Prozac [fluoxetine]    Social History:  Social History     Socioeconomic History  Marital status:      Spouse name: Not on file    Number of children: Not on file    Years of education: Not on file    Highest education level: Not on file   Occupational History    Occupation: [employer]   Social Needs    Financial resource strain: Not on file    Food insecurity:     Worry: Not on file     Inability: Not on file    Transportation needs:     Medical: Not on file     Non-medical: Not on file   Tobacco Use    Smoking status: Never Smoker    Smokeless tobacco: Never Used   Substance and Sexual Activity    Alcohol use: No    Drug use: No    Sexual activity: Yes     Partners: Male   Lifestyle    Physical activity:     Days per week: Not on file     Minutes per session: Not on file    Stress: Not on file   Relationships    Social connections:     Talks on phone: Not on file     Gets together: Not on file     Attends Nondenominational service: Not on file     Active member of club or organization: Not on file     Attends meetings of clubs or organizations: Not on file     Relationship status: Not on file    Intimate partner violence:     Fear of current or ex partner: Not on file     Emotionally abused: Not on file     Physically abused: Not on file     Forced sexual activity: Not on file   Other Topics Concern    Not on file   Social History Narrative     Paul Grandchild    Daughter-Sandra grimaldo 1 hour weekly        Older daughter-Sofia in [facility]       Family History:      Problem Relation Age of Onset    Heart Disease Mother     Arthritis Mother     Asthma Mother     Coronary Art Dis Mother     Heart Disease Father     Arthritis Father     Diabetes Father     Coronary Art Dis Father     Depression Father     Hypertension Father     Cancer Sister        Vital Signs:  Vitals:    05/28/19 0728   BP: 103/75   Pulse: 77   Resp: 16   Temp: 97.6 °F (36.4 °C)   SpO2: 100%

## 2019-05-28 NOTE — ANESTHESIA POSTPROCEDURE EVALUATION
Department of Anesthesiology  Postprocedure Note    Patient: Heron De Jesus  MRN: [id number]  YOB: 1970  Date of evaluation: 5/28/2019  Time:  8:40 AM     Procedure Summary     Date:  05/28/19 Room / Location:  [facility] ENDO 01 / [facility] ENDOSCOPY    Anesthesia Start:  8764 Anesthesia Stop:  0840    Procedure:  COLONOSCOPY POLYPECTOMY SNARE/COLD BIOPSY Diagnosis:  (Z12.11 - COLON CANCER SCREENING)    Surgeon:  Henry Pa MD Responsible Provider:  Pamela Day MD    Anesthesia Type:  general ASA Status:  2          Anesthesia Type: general    Sindhu Phase I: Sindhu Score: 10    Sindhu Phase II:      Last vitals: Reviewed and per EMR flowsheets.        Anesthesia Post Evaluation    Patient location during evaluation: PACU  Patient participation: complete - patient participated  Level of consciousness: awake and awake and alert  Pain score: 2  Airway patency: patent  Nausea & Vomiting: no vomiting  Complications: no  Cardiovascular status: blood pressure returned to baseline  Respiratory status: acceptable  Hydration status: euvolemic

## 2019-08-22 ENCOUNTER — OFFICE VISIT (OUTPATIENT)
Dept: BARIATRICS/WEIGHT MGMT | Age: 49
End: 2019-08-22
Payer: COMMERCIAL

## 2019-08-22 VITALS
WEIGHT: 171 LBS | SYSTOLIC BLOOD PRESSURE: 97 MMHG | HEIGHT: 66 IN | BODY MASS INDEX: 27.48 KG/M2 | HEART RATE: 74 BPM | DIASTOLIC BLOOD PRESSURE: 61 MMHG

## 2019-08-22 DIAGNOSIS — E78.2 MIXED HYPERLIPIDEMIA: ICD-10-CM

## 2019-08-22 DIAGNOSIS — K21.9 CHRONIC GERD: ICD-10-CM

## 2019-08-22 DIAGNOSIS — Z98.84 S/P LAPAROSCOPIC SLEEVE GASTRECTOMY: Primary | ICD-10-CM

## 2019-08-22 DIAGNOSIS — E66.3 OVERWEIGHT (BMI 25.0-29.9): ICD-10-CM

## 2019-08-22 PROCEDURE — 99213 OFFICE O/P EST LOW 20 MIN: CPT | Performed by: NURSE PRACTITIONER

## 2019-08-22 ASSESSMENT — ENCOUNTER SYMPTOMS
RESPIRATORY NEGATIVE: 1
GASTROINTESTINAL NEGATIVE: 1
EYES NEGATIVE: 1

## 2019-08-22 NOTE — PATIENT INSTRUCTIONS
release gas and can expand the stomach.  Continue to keep temptation from your kitchen- Keep your pantry and kitchen cabinets cleaned out of those dangerous foods that might tempt you after surgery (chips, cookies, candy, etc.).  Continue to increase your exercise program- Increase your daily physical activity. Aim for 5-6 days per week for 30 minutes. Walking is an easy way to get started with exercising. Exercise is going to be a regular part of your life after surgery.  Make sure you have a good support system- There will be many changes and adjustments to make after surgery. It is important to have a supportive friend, family member or co-worker, etc. with whom you can talk. Continue to attend [facility]) Weight Management support groups as they can be helpful in maintaining behaviors. In addition, it is the responsibility of the patient to schedule and follow up on labs and tests completed after surgery. Results will be reviewed at each visit. Patient received dietary handouts and education. Goals: Continue with healthy eating;  Increase activity as tolerated - aim for 3 active days

## 2019-09-06 DIAGNOSIS — Z98.84 S/P LAPAROSCOPIC SLEEVE GASTRECTOMY: ICD-10-CM

## 2019-09-06 DIAGNOSIS — E66.3 OVERWEIGHT (BMI 25.0-29.9): ICD-10-CM

## 2019-09-06 LAB
A/G RATIO: 1.8 (ref 1.1–2.2)
ALBUMIN SERPL-MCNC: 4.4 G/DL (ref 3.4–5)
ALP BLD-CCNC: 80 U/L (ref 40–129)
ALT SERPL-CCNC: 9 U/L (ref 10–40)
ANION GAP SERPL CALCULATED.3IONS-SCNC: 13 MMOL/L (ref 3–16)
AST SERPL-CCNC: 12 U/L (ref 15–37)
BASOPHILS ABSOLUTE: 0 K/UL (ref 0–0.2)
BASOPHILS RELATIVE PERCENT: 1 %
BILIRUB SERPL-MCNC: 0.5 MG/DL (ref 0–1)
BUN BLDV-MCNC: 19 MG/DL (ref 7–20)
CALCIUM SERPL-MCNC: 9.5 MG/DL (ref 8.3–10.6)
CHLORIDE BLD-SCNC: 100 MMOL/L (ref 99–110)
CHOLESTEROL, TOTAL: 184 MG/DL (ref 0–199)
CO2: 29 MMOL/L (ref 21–32)
CREAT SERPL-MCNC: 0.7 MG/DL (ref 0.6–1.1)
EOSINOPHILS ABSOLUTE: 0 K/UL (ref 0–0.6)
EOSINOPHILS RELATIVE PERCENT: 1.6 %
ESTIMATED AVERAGE GLUCOSE: 102.5 MG/DL
FOLATE: 8.86 NG/ML (ref 4.78–24.2)
GFR AFRICAN AMERICAN: >60
GFR NON-AFRICAN AMERICAN: >60
GLOBULIN: 2.4 G/DL
GLUCOSE BLD-MCNC: 79 MG/DL (ref 70–99)
HBA1C MFR BLD: 5.2 %
HCT VFR BLD CALC: 36.4 % (ref 36–48)
HDLC SERPL-MCNC: 61 MG/DL (ref 40–60)
HEMOGLOBIN: 12.1 G/DL (ref 12–16)
IRON SATURATION: 34 % (ref 15–50)
IRON: 89 UG/DL (ref 37–145)
LDL CHOLESTEROL CALCULATED: 110 MG/DL
LYMPHOCYTES ABSOLUTE: 0.8 K/UL (ref 1–5.1)
LYMPHOCYTES RELATIVE PERCENT: 30.1 %
MCH RBC QN AUTO: 31.4 PG (ref 26–34)
MCHC RBC AUTO-ENTMCNC: 33.3 G/DL (ref 31–36)
MCV RBC AUTO: 94.3 FL (ref 80–100)
MONOCYTES ABSOLUTE: 0.2 K/UL (ref 0–1.3)
MONOCYTES RELATIVE PERCENT: 8.8 %
NEUTROPHILS ABSOLUTE: 1.6 K/UL (ref 1.7–7.7)
NEUTROPHILS RELATIVE PERCENT: 58.5 %
PDW BLD-RTO: 13.2 % (ref 12.4–15.4)
PLATELET # BLD: 164 K/UL (ref 135–450)
PMV BLD AUTO: 9.5 FL (ref 5–10.5)
POTASSIUM SERPL-SCNC: 4.8 MMOL/L (ref 3.5–5.1)
RBC # BLD: 3.86 M/UL (ref 4–5.2)
SODIUM BLD-SCNC: 142 MMOL/L (ref 136–145)
TOTAL IRON BINDING CAPACITY: 264 UG/DL (ref 260–445)
TOTAL PROTEIN: 6.8 G/DL (ref 6.4–8.2)
TRIGL SERPL-MCNC: 67 MG/DL (ref 0–150)
TSH REFLEX: 2.62 UIU/ML (ref 0.27–4.2)
VITAMIN B-12: 397 PG/ML (ref 211–911)
VITAMIN D 25-HYDROXY: 93 NG/ML
VLDLC SERPL CALC-MCNC: 13 MG/DL
WBC # BLD: 2.7 K/UL (ref 4–11)

## 2019-09-08 LAB — VITAMIN K1: 0.54 NMOL/L (ref 0.22–4.88)

## 2019-09-09 LAB
ALPHA-TOCOPHEROL: 9.7 MG/L (ref 5.5–18)
GAMMA-TOCOPHEROL: 0.9 MG/L (ref 0–6)
RETINYL PALMITATE: <0.02 MG/L (ref 0–0.1)
VITAMIN A LEVEL: 0.49 MG/L (ref 0.3–1.2)
VITAMIN A, INTERP: NORMAL
VITAMIN B1 WHOLE BLOOD: 79 NMOL/L (ref 70–180)

## 2019-09-12 ENCOUNTER — TELEPHONE (OUTPATIENT)
Dept: BARIATRICS/WEIGHT MGMT | Age: 49
End: 2019-09-12

## 2019-09-12 ENCOUNTER — PATIENT MESSAGE (OUTPATIENT)
Dept: BARIATRICS/WEIGHT MGMT | Age: 49
End: 2019-09-12

## 2019-09-19 NOTE — TELEPHONE ENCOUNTER
Spoke with patient about her lab results. Discussed that her cholesterol is much improved from prior labs. Her WBC, neutrophils and lymphocytes are low. She does have a history of cancer so is concerned about her WBC count being low. She does currently have a viral infection. She will first follow up with her PCP for a recheck of her CBC and to determine a cause, then will follow up with [facility] based on those results. All other labs reviewed. Patient verbalized understanding. No further questions.

## 2019-10-25 ENCOUNTER — TELEPHONE (OUTPATIENT)
Dept: SURGERY | Age: 49
End: 2019-10-25

## 2019-11-25 ENCOUNTER — TELEPHONE (OUTPATIENT)
Dept: SURGERY | Age: 49
End: 2019-11-25

## 2020-04-14 ENCOUNTER — HOSPITAL ENCOUNTER (OUTPATIENT)
Dept: CT IMAGING | Age: 50
Discharge: HOME | End: 2020-04-14
Payer: COMMERCIAL

## 2020-04-14 PROCEDURE — 6360000004 HC RX CONTRAST MEDICATION: Performed by: INTERNAL MEDICINE

## 2020-04-14 PROCEDURE — 74177 CT ABD & PELVIS W/CONTRAST: CPT

## 2020-04-14 RX ADMIN — IOPAMIDOL 80 ML: 755 INJECTION, SOLUTION INTRAVENOUS at 12:43

## 2020-04-14 RX ADMIN — IOHEXOL 50 ML: 240 INJECTION, SOLUTION INTRATHECAL; INTRAVASCULAR; INTRAVENOUS; ORAL at 12:42

## 2020-07-01 ENCOUNTER — TELEMEDICINE (OUTPATIENT)
Dept: BARIATRICS/WEIGHT MGMT | Age: 50
End: 2020-07-01
Payer: COMMERCIAL

## 2020-07-01 PROCEDURE — 99213 OFFICE O/P EST LOW 20 MIN: CPT | Performed by: NURSE PRACTITIONER

## 2020-07-01 ASSESSMENT — ENCOUNTER SYMPTOMS
RESPIRATORY NEGATIVE: 1
EYES NEGATIVE: 1
GASTROINTESTINAL NEGATIVE: 1

## 2020-07-01 NOTE — PATIENT INSTRUCTIONS
release gas and can expand the stomach.  Continue to keep temptation from your kitchen- Keep your pantry and kitchen cabinets cleaned out of those dangerous foods that might tempt you after surgery (chips, cookies, candy, etc.).  Continue to increase your exercise program- Increase your daily physical activity. Aim for 5-6 days per week for 30 minutes. Walking is an easy way to get started with exercising. Exercise is going to be a regular part of your life after surgery.  Make sure you have a good support system- There will be many changes and adjustments to make after surgery. It is important to have a supportive friend, family member or co-worker, etc. with whom you can talk. Continue to attend [facility]) Weight Management support groups as they can be helpful in maintaining behaviors. In addition, it is the responsibility of the patient to schedule and follow up on labs and tests completed after surgery. Results will be reviewed at each visit. Patient received dietary handouts and education.     Goals:   - Continue plan  - Try [employer]

## 2020-07-01 NOTE — PROGRESS NOTES
Dietary Assessment Note    Vitals: CW: 155 lb    Patient lost 16 lbs over past ~11 months. Total Weight Loss: 106 lbs    Labs reviewed: reviewed 4/14/20     Protein intake: 60-80 grams/day     Fluid intake: 48-64 oz/day - 2 bottles of water / afshan     Multivitamin/mineral intake: yes - 2 MVI    Calcium intake: yes - 2 citracal petite, not really taking - struggling to take, make her sick    Other: Vit D / Vit B complex / Miles Jacobsen for neuropathy    Exercise: yes - walking 3x/wk for ~30 min / limited d/t gym closures    Nutrition Assessment: 1 year 6 month post-op visit. Pt is eating at least 4x/day, sometime 5x/day. Pt is focusing on protein. cheese stick / chicken / Wilnette Bucy / eggs      Amount able to eat per sitting: ~3/4 cup to 1 cup volume, depending on food    Following 30/30/30 rule: yes     Food Intolerances/issues: milk    Client Concerns: none    Goals:   - Continue plan  - Try [employer]    Plan: f/u as directed     Due to the COVID-19 restrictions on close contact interactions the patient's visit was conducted via telephone in liang of a face to face visit. The patient is here through telemedicine for their 1 year 6 month post-op visit.     [employer]

## 2020-07-01 NOTE — PROGRESS NOTES
HISTORY Right 05/12/2016    right subclavian port-a-cath; removed    ROTATOR CUFF REPAIR Right 2012    SLEEVE GASTRECTOMY  08/22/2018    LAPAROSCOPIC SLEEVE GASTRECTOMY-ETHICON    UPPER GASTROINTESTINAL ENDOSCOPY  02/23/2018     Family History   Problem Relation Age of Onset    Heart Disease Mother     Arthritis Mother     Asthma Mother     Coronary Art Dis Mother     Heart Disease Father     Arthritis Father     Diabetes Father     Coronary Art Dis Father     Depression Father     Hypertension Father     Cancer Sister      Social History     Tobacco Use    Smoking status: Never Smoker    Smokeless tobacco: Never Used   Substance Use Topics    Alcohol use: No     I counseled the patient on the importance of not smoking and risks of ETOH. Allergies   Allergen Reactions    Adhesive Tape      Irritates skin    Percocet [Oxycodone-Acetaminophen] Itching and Nausea And Vomiting    Prozac [Fluoxetine] Nausea And Vomiting     There were no vitals filed for this visit. There is no height or weight on file to calculate BMI.     Current Outpatient Medications:     omeprazole (PRILOSEC) 20 MG delayed release capsule, Take 1 capsule by mouth daily, Disp: 30 capsule, Rfl: 2    citalopram (CELEXA) 20 MG tablet, TAKE ONE TABLET BY MOUTH ONE TIME A DAY FOR ANXIETY, Disp: 90 tablet, Rfl: 1    nystatin (NYSTATIN) 515555 UNIT/GM powder, APPLY AS DIRECTED TWICE DAILY, Disp: 56.7 g, Rfl: 1    ergocalciferol (DRISDOL) 56460 units capsule, 1 cap po twice weekly, Disp: 24 capsule, Rfl: 3    NONFORMULARY, Take 615 mg by mouth daily, Disp: , Rfl:     melatonin 3 MG TABS tablet, Take 10 mg by mouth nightly as needed , Disp: , Rfl:     B Complex Vitamins (VITAMIN B COMPLEX PO), Take by mouth, Disp: , Rfl:     fluticasone (FLONASE) 50 MCG/ACT nasal spray, 2 sprays each nostril daily, Disp: 3 Bottle, Rfl: 3    Lab Results   Component Value Date    WBC 3.8 04/14/2020    RBC 4.13 04/14/2020    RBC 4.18 04/19/2017 HGB 13.0 04/14/2020    HCT 38.4 04/14/2020    MCV 93.1 04/14/2020    MCH 31.5 04/14/2020    MCHC 33.9 04/14/2020    MPV 9.7 04/14/2020    NEUTOPHILPCT 71.4 04/14/2020    LYMPHOPCT 22.3 04/14/2020    LYMPHOPCT 27.0 04/19/2017    MONOPCT 5.2 04/14/2020    EOSRELPCT 0.3 04/14/2020    BASOPCT 0.8 04/14/2020    NEUTROABS 2.7 04/14/2020    LYMPHSABS 0.8 04/14/2020    MONOSABS 0.2 04/14/2020    EOSABS 0.0 04/14/2020     Lab Results   Component Value Date     04/14/2020    K 3.8 04/14/2020    K 4.5 08/23/2018    CL 99 04/14/2020    CO2 28 04/14/2020    ANIONGAP 13 04/14/2020    GLUCOSE 82 04/14/2020    GLUCOSE 103 08/31/2016    BUN 9 04/14/2020    CREATININE 0.5 04/14/2020    LABGLOM >60 04/14/2020    GFRAA >60 04/14/2020    CALCIUM 9.6 04/14/2020    PROT 6.8 04/14/2020    PROT 6.8 08/31/2016    LABALBU 4.4 04/14/2020    AGRATIO 1.8 04/14/2020    BILITOT 0.6 04/14/2020    ALKPHOS 73 04/14/2020    ALT 17 04/14/2020    AST 17 04/14/2020    GLOB 2.4 04/14/2020     Lab Results   Component Value Date    CHOL 184 09/06/2019    TRIG 67 09/06/2019    HDL 61 09/06/2019    LDLCALC 110 09/06/2019    LABVLDL 13 09/06/2019     Lab Results   Component Value Date    TSHREFLEX 2.62 09/06/2019     Lab Results   Component Value Date    IRON 89 09/06/2019    TIBC 264 09/06/2019    LABIRON 34 09/06/2019     Lab Results   Component Value Date    KIOOZLWQ21 397 09/06/2019    FOLATE 8.86 09/06/2019     Lab Results   Component Value Date    VITD25 93.0 09/06/2019     Lab Results   Component Value Date    LABA1C 5.2 09/06/2019    .5 09/06/2019       Review of Systems   Constitutional: Negative. HENT: Negative. Eyes: Negative. Respiratory: Negative. Cardiovascular: Negative. Gastrointestinal: Negative. Skin: Negative. Neurological: Negative.       PHYSICAL EXAMINATION:    Constitutional: [x] Appears well-developed and well-nourished [x] No apparent distress      [] Abnormal-   Mental status  [x] Alert and awake  [x] Oriented to person/place/time [x]Able to follow commands      Eyes:  EOM    [x]  Normal  [] Abnormal-  Sclera  [x]  Normal  [] Abnormal -         Discharge [x]  None visible  [] Abnormal -    HENT:   [x] Normocephalic, atraumatic. [] Abnormal   [x] Mouth/Throat: Mucous membranes are moist.     External Ears [x] Normal  [] Abnormal-     Neck: [x] No visualized mass     Pulmonary/Chest: [x] Respiratory effort normal.  [x] No visualized signs of difficulty breathing or respiratory distress        [] Abnormal-      Musculoskeletal:   [] Normal gait with no signs of ataxia         [x] Normal range of motion of neck        [] Abnormal-     Neurological:        [x] No Facial Asymmetry (Cranial nerve 7 motor function) (limited exam to video visit)          [x] No gaze palsy        [] Abnormal-         Skin:        [x] No significant exanthematous lesions or discoloration noted on facial skin         [] Abnormal-            Psychiatric:       [x] Normal Affect [x] No Hallucinations        [] Abnormal-     Other pertinent observable physical exam findings-     Due to this being a TeleHealth encounter, evaluation of the following organ systems is limited: Vitals/Constitutional/EENT/Resp/CV/GI//MS/Neuro/Skin/Heme-Lymph-Imm. Assessment and Plan:   Patient is here via telemedicine and is 1 year 11 months s/p sleeve gastrectomy, down 16lbs with a total weight loss of 106 lbs. She is doing well, denies n/v/dysphagia or reflux. She is tolerating diet, getting adequate fluids and protein. She has struggled more recently with craving sweets, likely due to working from home now and not meal prepping. She is exercising by trying to stay active at home. Encouraged continued physical activity. She is taking vitamins as instructed. She did speak with the registered dietitian for continued follow up. I agree with recommendations and plan. Labs were ordered at this visit.  Patient understands it is their responsibility to have these

## 2020-07-31 ENCOUNTER — VIRTUAL VISIT (OUTPATIENT)
Dept: FAMILY MEDICINE CLINIC | Age: 50
End: 2020-07-31
Payer: COMMERCIAL

## 2020-07-31 PROCEDURE — 99386 PREV VISIT NEW AGE 40-64: CPT | Performed by: NURSE PRACTITIONER

## 2020-07-31 RX ORDER — CITALOPRAM 20 MG/1
20 TABLET ORAL DAILY
Qty: 90 TABLET | Refills: 3 | Status: SHIPPED | OUTPATIENT
Start: 2020-07-31

## 2020-07-31 ASSESSMENT — ENCOUNTER SYMPTOMS
COLOR CHANGE: 0
VOICE CHANGE: 0
ABDOMINAL PAIN: 0
EYES NEGATIVE: 1
WHEEZING: 0
ABDOMINAL DISTENTION: 0
SINUS PRESSURE: 0
DIARRHEA: 0
EYE DISCHARGE: 0
BLOOD IN STOOL: 0
SHORTNESS OF BREATH: 0
BACK PAIN: 0
SORE THROAT: 0
APNEA: 0
VOMITING: 0
PHOTOPHOBIA: 0
EYE REDNESS: 0
RECTAL PAIN: 0
CHOKING: 0
CHEST TIGHTNESS: 0
SINUS PAIN: 0
EYE PAIN: 0
NAUSEA: 0
STRIDOR: 0
RHINORRHEA: 1
ANAL BLEEDING: 0
GASTROINTESTINAL NEGATIVE: 1
EYE ITCHING: 0
FACIAL SWELLING: 0
COUGH: 0
RESPIRATORY NEGATIVE: 1
CONSTIPATION: 0
TROUBLE SWALLOWING: 0

## 2020-07-31 ASSESSMENT — PATIENT HEALTH QUESTIONNAIRE - PHQ9
SUM OF ALL RESPONSES TO PHQ9 QUESTIONS 1 & 2: 0
SUM OF ALL RESPONSES TO PHQ QUESTIONS 1-9: 0
SUM OF ALL RESPONSES TO PHQ QUESTIONS 1-9: 0
2. FEELING DOWN, DEPRESSED OR HOPELESS: 0
1. LITTLE INTEREST OR PLEASURE IN DOING THINGS: 0

## 2020-07-31 NOTE — PROGRESS NOTES
2020    TELEHEALTH EVALUATION -- Audio/Visual (During LPDYY-91 public health emergency)    HPI:    Shira Garcia (:  1970) has requested an audio/video evaluation for the following concern(s):    Establish care. Pt transferred from Dr Richard Pillai and Dr Barron Dobbins. PMH for triple negative breast cancer which is currently in remission for the past 4 yrs. Not on chemo any longer. No problems w heart disease from the chemo. Lungs clear no COPD. She does express some anxiety w everything she  Has been through and recent problems w children, however she finds she can manage w Celexa 20 mg daily. She has Klonopin prescribed but finds she rarely uses it. FH for heart disease both mother an father but pt denies any cardiac problems. Recently had weight loss surgery by Dr Wellington Uribe still following in that clinic. 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 Recent labs in April wnl will recheck in January. Review of Systems   Constitutional: Negative. Negative for activity change, appetite change, chills, diaphoresis, fatigue, fever and unexpected weight change. HENT: Positive for rhinorrhea. Negative for congestion, dental problem (poor dentition), drooling, ear discharge, ear pain, facial swelling, hearing loss, mouth sores, nosebleeds, postnasal drip, sinus pressure, sinus pain, sneezing, sore throat, tinnitus, trouble swallowing and voice change. Eyes: Negative. Negative for photophobia, pain, discharge, redness, itching and visual disturbance. Respiratory: Negative. Negative for apnea, cough, choking, chest tightness, shortness of breath, wheezing and stridor. Cardiovascular: Negative. Negative for chest pain, palpitations and leg swelling. Gastrointestinal: Negative. Negative for abdominal distention, abdominal pain, anal bleeding, blood in stool, constipation, diarrhea, nausea, rectal pain and vomiting.         GERD-PPI   Endocrine: Negative for cold intolerance, heat intolerance, polydipsia, polyphagia and polyuria. Hx triple negative breast cancer now in remission for 4 yrs. Lymph edema of breast   Genitourinary: Negative. Negative for decreased urine volume, difficulty urinating, dyspareunia, dysuria, enuresis, flank pain, frequency, genital sores, hematuria, menstrual problem, pelvic pain, urgency, vaginal bleeding, vaginal discharge and vaginal pain. Musculoskeletal: Negative for arthralgias, back pain, gait problem, joint swelling, myalgias, neck pain and neck stiffness. Hx carpal tunnel not bothered now, hx arthritis in knees   Skin: Negative. Negative for color change, pallor, rash and wound. Allergic/Immunologic: Positive for environmental allergies (rhinnorhea - flonase). Negative for food allergies and immunocompromised state. Neurological: Negative for dizziness, tremors, seizures, syncope, facial asymmetry, speech difficulty, weakness, light-headedness, numbness and headaches (migraines). Neuropathy fingers and toes   Hematological: Negative for adenopathy. Bruises/bleeds easily. Psychiatric/Behavioral: Negative for agitation, behavioral problems, confusion, decreased concentration, dysphoric mood, hallucinations, self-injury and suicidal ideas. Sleep disturbance: melantonin. The patient is not nervous/anxious and is not hyperactive. Depression - celexa       Prior to Visit Medications    Medication Sig Taking?  Authorizing Provider   citalopram (CELEXA) 20 MG tablet Take 1 tablet by mouth daily Yes Ernst Sterling APRN - CNP   ergocalciferol (DRISDOL) 22925 units capsule 1 cap po twice weekly Yes Oswaldo Giles MD   NONFORMULARY Take 615 mg by mouth daily Yes Historical Provider, MD   melatonin 3 MG TABS tablet Take 10 mg by mouth nightly as needed  Yes Historical Provider, MD   B Complex Vitamins (VITAMIN B COMPLEX PO) Take by mouth Yes Historical Provider, MD   fluticasone (FLONASE) 50 MCG/ACT nasal spray 2 sprays each nostril daily Yes David Mccartney MD   omeprazole (PRILOSEC) 20 MG delayed release capsule Take 1 capsule by mouth daily  Patient not taking: Reported on 7/31/2020  Earleen Garcia MD       Social History     Tobacco Use    Smoking status: Never Smoker    Smokeless tobacco: Never Used   Substance Use Topics    Alcohol use: No    Drug use: No            PHYSICAL EXAMINATION:  [ INSTRUCTIONS:  \"[x]\" Indicates a positive item  \"[]\" Indicates a negative item  -- DELETE ALL ITEMS NOT EXAMINED]  Vital Signs: (As obtained by patient/caregiver or practitioner observation)    Blood pressure-  Heart rate-    Respiratory rate-    Temperature-  Pulse oximetry-     Constitutional: [x] Appears well-developed and well-nourished [x] No apparent distress      [] Abnormal-   Mental status  [x] Alert and awake  [x] Oriented to person/place/time [x]Able to follow commands      Eyes:  EOM    [x]  Normal  [] Abnormal-  Sclera  [x]  Normal  [] Abnormal -         Discharge [x]  None visible  [] Abnormal -    HENT:   [x] Normocephalic, atraumatic. [] Abnormal   [] Mouth/Throat: Mucous membranes are moist.     External Ears [] Normal  [] Abnormal-     Neck: [x] No visualized mass     Pulmonary/Chest: [x] Respiratory effort normal.  [x] No visualized signs of difficulty breathing or respiratory distress        [] Abnormal-      Musculoskeletal:   [] Normal gait with no signs of ataxia         [x] Normal range of motion of neck        [] Abnormal-       Neurological:        [] No Facial Asymmetry (Cranial nerve 7 motor function) (limited exam to video visit)          [x] No gaze palsy        [] Abnormal-         Skin:        [x] No significant exanthematous lesions or discoloration noted on facial skin         [] Abnormal-            Psychiatric:       [x] Normal Affect [] No Hallucinations        [] Abnormal-     Other pertinent observable physical exam findings- poor dentition.  Has lost a significant amount of weight w gastric sleeve, still has lymphedema of breast from chemo    ASSESSMENT/PLAN:  Establish care  Labs - January 2021  Physical to follow  Depression - refill her Celexa 20 mg daily      Shira Garcia is a 44 y.o. female being evaluated by a Virtual Visit (video visit) encounter to address concerns as mentioned above. A caregiver was present when appropriate. Due to this being a TeleHealth encounter (During [facility] public [facility] emergency), evaluation of the following organ systems was limited: Vitals/Constitutional/EENT/Resp/CV/GI//MS/Neuro/Skin/Heme-Lymph-Imm. Pursuant to the emergency declaration under the [address] [place] [address] authority and the [employer] and [employer] Act, this Virtual Visit was conducted with patient's (and/or legal guardian's) consent, to reduce the patient's risk of exposure to COVID-19 and provide necessary medical care. The patient (and/or legal guardian) has also been advised to contact this office for worsening conditions or problems, and seek emergency medical treatment and/or call 911 if deemed necessary. Patient identification was verified at the start of the visit: Yes    Total time spent on this encounter: 30 min    Services were provided through a video synchronous discussion virtually to substitute for in-person clinic visit. Patient and provider were located at their individual homes. --JASON Ordoñez - CNP on 7/31/2020 at 10:22 AM    An electronic signature was used to authenticate this note.

## 2020-10-20 RX ORDER — FLUTICASONE PROPIONATE 50 MCG
SPRAY, SUSPENSION (ML) NASAL
Qty: 3 BOTTLE | Refills: 3 | Status: SHIPPED | OUTPATIENT
Start: 2020-10-20

## 2020-11-07 DIAGNOSIS — Z98.84 S/P LAPAROSCOPIC SLEEVE GASTRECTOMY: ICD-10-CM

## 2020-11-07 DIAGNOSIS — E78.2 MIXED HYPERLIPIDEMIA: ICD-10-CM

## 2020-11-07 DIAGNOSIS — E66.3 OVERWEIGHT (BMI 25.0-29.9): ICD-10-CM

## 2020-11-07 LAB
A/G RATIO: 1.8 (ref 1.1–2.2)
ALBUMIN SERPL-MCNC: 4.1 G/DL (ref 3.4–5)
ALP BLD-CCNC: 69 U/L (ref 40–129)
ALT SERPL-CCNC: 8 U/L (ref 10–40)
ANION GAP SERPL CALCULATED.3IONS-SCNC: 8 MMOL/L (ref 3–16)
AST SERPL-CCNC: 11 U/L (ref 15–37)
BASOPHILS ABSOLUTE: 0 K/UL (ref 0–0.2)
BASOPHILS RELATIVE PERCENT: 1.1 %
BILIRUB SERPL-MCNC: 0.4 MG/DL (ref 0–1)
BUN BLDV-MCNC: 13 MG/DL (ref 7–20)
CALCIUM SERPL-MCNC: 9.3 MG/DL (ref 8.3–10.6)
CHLORIDE BLD-SCNC: 102 MMOL/L (ref 99–110)
CHOLESTEROL, TOTAL: 158 MG/DL (ref 0–199)
CO2: 31 MMOL/L (ref 21–32)
CREAT SERPL-MCNC: 0.6 MG/DL (ref 0.6–1.1)
EOSINOPHILS ABSOLUTE: 0 K/UL (ref 0–0.6)
EOSINOPHILS RELATIVE PERCENT: 1.5 %
FOLATE: 9.06 NG/ML (ref 4.78–24.2)
GFR AFRICAN AMERICAN: >60
GFR NON-AFRICAN AMERICAN: >60
GLOBULIN: 2.3 G/DL
GLUCOSE BLD-MCNC: 85 MG/DL (ref 70–99)
HCT VFR BLD CALC: 36.9 % (ref 36–48)
HDLC SERPL-MCNC: 62 MG/DL (ref 40–60)
HEMOGLOBIN: 12.6 G/DL (ref 12–16)
IRON SATURATION: 40 % (ref 15–50)
IRON: 95 UG/DL (ref 37–145)
LDL CHOLESTEROL CALCULATED: 87 MG/DL
LYMPHOCYTES ABSOLUTE: 0.9 K/UL (ref 1–5.1)
LYMPHOCYTES RELATIVE PERCENT: 32.6 %
MCH RBC QN AUTO: 31.7 PG (ref 26–34)
MCHC RBC AUTO-ENTMCNC: 34.1 G/DL (ref 31–36)
MCV RBC AUTO: 93 FL (ref 80–100)
MONOCYTES ABSOLUTE: 0.2 K/UL (ref 0–1.3)
MONOCYTES RELATIVE PERCENT: 7.7 %
NEUTROPHILS ABSOLUTE: 1.6 K/UL (ref 1.7–7.7)
NEUTROPHILS RELATIVE PERCENT: 57.1 %
PDW BLD-RTO: 13.4 % (ref 12.4–15.4)
PLATELET # BLD: 177 K/UL (ref 135–450)
PMV BLD AUTO: 9.1 FL (ref 5–10.5)
POTASSIUM SERPL-SCNC: 4.6 MMOL/L (ref 3.5–5.1)
RBC # BLD: 3.97 M/UL (ref 4–5.2)
SODIUM BLD-SCNC: 141 MMOL/L (ref 136–145)
TOTAL IRON BINDING CAPACITY: 237 UG/DL (ref 260–445)
TOTAL PROTEIN: 6.4 G/DL (ref 6.4–8.2)
TRIGL SERPL-MCNC: 46 MG/DL (ref 0–150)
TSH REFLEX: 1.65 UIU/ML (ref 0.27–4.2)
VITAMIN B-12: 425 PG/ML (ref 211–911)
VITAMIN D 25-HYDROXY: 166 NG/ML
VLDLC SERPL CALC-MCNC: 9 MG/DL
WBC # BLD: 2.8 K/UL (ref 4–11)

## 2020-11-08 LAB
ESTIMATED AVERAGE GLUCOSE: 108.3 MG/DL
HBA1C MFR BLD: 5.4 %

## 2020-11-11 LAB
ALPHA-TOCOPHEROL: 8.2 MG/L (ref 5.5–18)
GAMMA-TOCOPHEROL: 0.9 MG/L (ref 0–6)
RETINYL PALMITATE: <0.02 MG/L (ref 0–0.1)
VITAMIN A LEVEL: 0.47 MG/L (ref 0.3–1.2)
VITAMIN A, INTERP: NORMAL
VITAMIN B1 WHOLE BLOOD: 138 NMOL/L (ref 70–180)
VITAMIN K1: 0.39 NMOL/L (ref 0.22–4.88)

## 2020-11-24 ENCOUNTER — TELEPHONE (OUTPATIENT)
Dept: BARIATRICS/WEIGHT MGMT | Age: 50
End: 2020-11-24

## 2020-11-30 NOTE — TELEPHONE ENCOUNTER
Pt returning call. Pharm on file is correct, if long term, then use the mail order, if short term, [facility] is correct. Pt stated ok to leave message on voicemail. Pt is out of town at this time.

## 2020-12-01 NOTE — TELEPHONE ENCOUNTER
Spoke with patient about her lab results. Discussed that her Vitamin D level was high, she will discuss reducing her Vitamin D supplement with her PCP (currently on high dose weekly script). She has already reached out to her oncologist about her WBC counts, they are going to follow up with her about this. All other labs reviewed. Patient verbalized understanding. No further questions.

## 2021-01-29 ENCOUNTER — HOSPITAL ENCOUNTER (OUTPATIENT)
Dept: MAMMOGRAPHY | Age: 51
Discharge: HOME | End: 2021-01-29
Payer: COMMERCIAL

## 2021-01-29 DIAGNOSIS — I89.0 LYMPHEDEMA OF LEFT UPPER EXTREMITY: ICD-10-CM

## 2021-01-29 DIAGNOSIS — C50.912 MALIGNANT NEOPLASM OF LEFT FEMALE BREAST, UNSPECIFIED ESTROGEN RECEPTOR STATUS, UNSPECIFIED SITE OF BREAST (HCC): ICD-10-CM

## 2021-01-29 PROCEDURE — G0279 TOMOSYNTHESIS, MAMMO: HCPCS

## 2021-02-09 ENCOUNTER — TELEMEDICINE (OUTPATIENT)
Dept: BARIATRICS/WEIGHT MGMT | Age: 51
End: 2021-02-09
Payer: COMMERCIAL

## 2021-02-09 DIAGNOSIS — K21.9 CHRONIC GERD: ICD-10-CM

## 2021-02-09 DIAGNOSIS — E66.3 OVERWEIGHT (BMI 25.0-29.9): ICD-10-CM

## 2021-02-09 DIAGNOSIS — Z98.84 S/P LAPAROSCOPIC SLEEVE GASTRECTOMY: Primary | ICD-10-CM

## 2021-02-09 PROCEDURE — 99213 OFFICE O/P EST LOW 20 MIN: CPT | Performed by: NURSE PRACTITIONER

## 2021-02-09 ASSESSMENT — ENCOUNTER SYMPTOMS
GASTROINTESTINAL NEGATIVE: 1
RESPIRATORY NEGATIVE: 1
EYES NEGATIVE: 1

## 2021-02-09 NOTE — PROGRESS NOTES
[facility]) Physicians   [facility]    2/9/2021    TELEHEALTH EVALUATION -- Audio/Visual (During LAFDG-54 public health emergency)    Subjective:      Patient ID: April Terrell is a 44 y.o. female    HPI    2 years 5 months s/p sleeve gastrectomy    April Terrell is a 44 y.o. female , current BMI of 25.8, current weight of 160 pounds. Due to the COVID-19 restrictions on close contact interactions the patient's visit was conducted via audio/video in liang of a face to face visit. Patient has consented to have this visit conducted via audio/video. I am conducting this visit from home. The patient is here through telemedicine for their post op bariatric surgery visit. Patient denies any nausea, vomiting, fevers, chills, shortness of breath, chest pain, constipation or urinary symptoms. Denies any heartburn nor dysphagia. She is happy with her current weight.     Past Medical History:   Diagnosis Date    Allergic rhinitis     Anxiety 7/21/2015    Arthritis of both knees-xray 2017 9/26/2017    Cancer ([address]     breast cancer left    Chronic GERD 11/14/2017    Depression     pt states \"situational anxiety\"    Gestational diabetes     History of blood transfusion     Hypercholesterolemia 3/14/2016    no meds    Lymphedema of left arm     S/P LYMPH NODE SURGERY    Migraine     Morbid obesity with BMI of 40.0-44.9, adult ([address] 4/21/2014    PONV (postoperative nausea and vomiting)     Prediabetes 3/14/2016    Trochanteric bursitis of right hip 4/21/2014     Past Surgical History:   Procedure Laterality Date    BREAST BIOPSY Left     LEFT LUMPECTOMY, NODE DISSECTION    BREAST REDUCTION SURGERY      CHOLECYSTECTOMY  1994    COLONOSCOPY  5/28/2019    COLONOSCOPY POLYPECTOMY SNARE/COLD BIOPSY performed by Suzette Vivar MD at [address] [place] [address]  2004    HYSTERECTOMY, VAGINAL      LASIK      OTHER SURGICAL HISTORY Right 05/12/2016    right subclavian port-a-cath; NEUTOPHILPCT 57.1 11/07/2020    LYMPHOPCT 32.6 11/07/2020    LYMPHOPCT 27.0 04/19/2017    MONOPCT 7.7 11/07/2020    EOSRELPCT 1.5 11/07/2020    BASOPCT 1.1 11/07/2020    NEUTROABS 1.6 11/07/2020    LYMPHSABS 0.9 11/07/2020    MONOSABS 0.2 11/07/2020    EOSABS 0.0 11/07/2020     Lab Results   Component Value Date     11/07/2020    K 4.6 11/07/2020    K 4.5 08/23/2018     11/07/2020    CO2 31 11/07/2020    ANIONGAP 8 11/07/2020    GLUCOSE 85 11/07/2020    GLUCOSE 103 08/31/2016    BUN 13 11/07/2020    CREATININE 0.6 11/07/2020    LABGLOM >60 11/07/2020    GFRAA >60 11/07/2020    CALCIUM 9.3 11/07/2020    PROT 6.4 11/07/2020    PROT 6.8 08/31/2016    LABALBU 4.1 11/07/2020    AGRATIO 1.8 11/07/2020    BILITOT 0.4 11/07/2020    ALKPHOS 69 11/07/2020    ALT 8 11/07/2020    AST 11 11/07/2020    GLOB 2.3 11/07/2020     Lab Results   Component Value Date    CHOL 158 11/07/2020    TRIG 46 11/07/2020    HDL 62 11/07/2020    LDLCALC 87 11/07/2020    LABVLDL 9 11/07/2020     Lab Results   Component Value Date    TSHREFLEX 1.65 11/07/2020     Lab Results   Component Value Date    IRON 95 11/07/2020    TIBC 237 11/07/2020    LABIRON 40 11/07/2020     Lab Results   Component Value Date    KBXIRZTU54 425 11/07/2020    FOLATE 9.06 11/07/2020     Lab Results   Component Value Date    VITD25 166.0 11/07/2020     Lab Results   Component Value Date    LABA1C 5.4 11/07/2020    .3 11/07/2020       Review of Systems   Constitutional: Negative. HENT: Negative. Eyes: Negative. Respiratory: Negative. Cardiovascular: Negative. Gastrointestinal: Negative. Skin: Negative. Neurological: Negative.       PHYSICAL EXAMINATION:    Constitutional: [x] Appears well-developed and well-nourished [x] No apparent distress      [] Abnormal-   Mental status  [x] Alert and awake  [x] Oriented to person/place/time [x]Able to follow commands      Eyes:  EOM    [x]  Normal  [] Abnormal-  Sclera  [x]  Normal  [] Abnormal - Discharge [x]  None visible  [] Abnormal -    HENT:   [x] Normocephalic, atraumatic. [] Abnormal   [x] Mouth/Throat: Mucous membranes are moist.     External Ears [x] Normal  [] Abnormal-     Neck: [x] No visualized mass     Pulmonary/Chest: [x] Respiratory effort normal.  [x] No visualized signs of difficulty breathing or respiratory distress        [] Abnormal-      Musculoskeletal:   [] Normal gait with no signs of ataxia         [x] Normal range of motion of neck        [] Abnormal-     Neurological:        [x] No Facial Asymmetry (Cranial nerve 7 motor function) (limited exam to video visit)          [x] No gaze palsy        [] Abnormal-         Skin:        [x] No significant exanthematous lesions or discoloration noted on facial skin         [] Abnormal-            Psychiatric:       [x] Normal Affect [x] No Hallucinations        [] Abnormal-     Other pertinent observable physical exam findings-     Due to this being a TeleHealth encounter, evaluation of the following organ systems is limited: Vitals/Constitutional/EENT/Resp/CV/GI//MS/Neuro/Skin/Heme-Lymph-Imm. Assessment and Plan:   Patient is here via telemedicine and is 2 years 5 months s/p sleeve gastrectomy, up 5 lbs with a total weight loss of 101lbs. She is doing well, denies n/v/dysphagia or reflux. She is tolerating diet, getting adequate fluids and protein. She plans to start tracking her intakes again. She is exercising with some outdoor walking. She plans to start home exercise videos. Encouraged continued physical activity. She is taking vitamins as instructed. She did speak with the registered dietitian for continued follow up. I agree with recommendations and plan. She would like to see a plastic surgeon regarding excess skin, referral placed to Dr. Cipriano Hardwick. She was given number to call and schedule NP appt. We will plan to repeat labs at her next appt.  We will see her back in 6 months for continued follow up or via telemedicine depending on COVID-19 restrictions at the time of their next appointment. Total encounter time: 20 minutes, including any number of the following: Bariatric Pre/Post operative work up/protocols, review of labs, imaging, provider notes, outside hospital records, performing examination/evaluation, counseling patient and/or family, ordering medications/tests, placing referrals and communication with referring physicians, coordination of care; discussing dietary plan/recall with the patient as well with registered dietitian and documentation in the EHR. Of note, the above was done during same day of the actual patient encounter. An electronic signature was used to authenticate this note. Pursuant to the emergency declaration under the [facility], [facility] waiver authority and the [employer] and [employer] Act, this Virtual  Visit was conducted, with patient's consent, to reduce the patient's risk of exposure to COVID-19 and provide continuity of care for an established patient. Services were provided through a video synchronous discussion virtually to substitute for in-person clinic visit.

## 2021-02-09 NOTE — PROGRESS NOTES
Dietary Assessment Note      Vitals: There were no vitals filed for this visit. Patient gained 5 lbs over 7 months. Total Weight Loss: 101 lbs    Due to the COVID-19 restrictions on close contact interactions the patient's visit was conducted via telephone in liang of a face to face visit. The patient is here through telemedicine for their post op visit. Labs reviewed:     Results for Leeanna Crews (MRN [id number]) as of 2/9/2021 15:28   Ref. Range 11/7/2020 10:17   Sodium Latest Ref Range: 136 - 145 mmol/L 141   Potassium Latest Ref Range: 3.5 - 5.1 mmol/L 4.6   Chloride Latest Ref Range: 99 - 110 mmol/L 102   CO2 Latest Ref Range: 21 - 32 mmol/L 31   BUN Latest Ref Range: 7 - 20 mg/dL 13   Creatinine Latest Ref Range: 0.6 - 1.1 mg/dL 0.6   Anion Gap Latest Ref Range: 3 - 16  8   GFR Non- Latest Ref Range: >60  >60   GFR  Latest Ref Range: >60  >60   Glucose Latest Ref Range: 70 - 99 mg/dL 85   Calcium Latest Ref Range: 8.3 - 10.6 mg/dL 9.3   Total Protein Latest Ref Range: 6.4 - 8.2 g/dL 6.4     Results for Leeanna Crews (MRN [id number]) as of 2/9/2021 15:28   Ref. Range 11/7/2020 10:17   Cholesterol, Total Latest Ref Range: 0 - 199 mg/dL 158   HDL Cholesterol Latest Ref Range: 40 - 60 mg/dL 62 (H)   LDL Calculated Latest Ref Range: <100 mg/dL 87   Triglycerides Latest Ref Range: 0 - 150 mg/dL 46   VLDL Cholesterol Calculated Latest Ref Range: Not Established mg/dL 9   Results for Leeanna Crews (MRN [id number]) as of 2/9/2021 15:28   Ref. Range 11/7/2020 10:17   Hemoglobin A1C Latest Ref Range: See comment % 5.4   Results for Leeanna Crews (MRN [id number]) as of 2/9/2021 15:28   Ref.  Range 11/7/2020 10:18   Alpha-Tocopherol Latest Ref Range: 5.5 - 18.0 mg/L 8.2   RETINYL PALMITATE Latest Ref Range: 0.00 - 0.10 mg/L <0.02   Vitamin A Latest Ref Range: 0.30 - 1.20 mg/L 0.47   Vitamin A, Interp Unknown Normal   Results for Leeanna Crews (MRN [id number]) as of 2021 15:28   Ref. Range 2020 10:17   Iron Latest Ref Range: 37 - 145 ug/dL 95   Iron Saturation Latest Ref Range: 15 - 50 % 40   TIBC Latest Ref Range: 260 - 445 ug/dL 237 (L)   Vitamin B1,Whole Blood Latest Ref Range: 70 - 180 nmol/L 138   Folate Latest Ref Range: 4.78 - 24.20 ng/mL 9.06   Vitamin B-12 Latest Ref Range: 211 - 911 pg/mL 425     Protein intake: 60-80 grams/day     Fluid intake: 48-64 oz/day    Multivitamin/mineral intake: 4 fusion     Calcium intake: none     Other: Vit D, Vit B complex, boswellia for neuropathy    Exercise: None d/t compromised immune system and does not want to go to gym    Nutrition Assessment: 2 year 5 mo s/p sleeve post-op visit. Breakfast: 1/2 c raisin bran cereal with 1/2 c almond milk OR cheese/almonds/dried cranberries/yogurt    Snack: PB crackers OR string cheese     Lunch: tuna with wheat thins (10) and pears     Snack: Not usually but if eats popcorn OR just drinks fluids     Dinner: 1/2 turkey burger with bun with cheese/tomatoes and broiled  Potatoes/salad     Snack:  Sometimes - popsicle OR frozen fruit  calorie snack pack (pretzels/rice cake/trail mix/chex mix)     Fluids: Water, afshan, occasional green tea     Amount able to eat per sittin.5 cups     Following 30/30/30 rule: Still trying to slow down eating     Food Intolerances/issues: cows milk, pasta, breads    Client Concerns: none     Goals:    Increase non-starchy veggies   Slow down eating   Focus on protein with all meals/snacks   Start home workouts - plans to do Nintendo Switch with daughter     Plan: F/U per Provider     Padma Qureshi

## 2021-06-18 NOTE — PROGRESS NOTES
Eleanor Kaminski presented for her presurgical psychological evaluation on 03/13/18. The evaluation consisted of a clinical interview, the Eating Habits Checklist, and the [facility] Diagnostic ([facility]). Based on the evaluation, Eleanor Kaminski is considered to be an appropriate candidate for bariatric surgery from a psychological standpoint. She acknowledges situational anxiety over the past two years secondary to her diagnosis with breast cancer. She also endorsed an episode of depression in 2012 related to family/marital stressors, as well as cyclical depressive symptoms coinciding with her menstrual cycle in her mid-30's (PMDD). She is currently prescribed Celexa 10mg by her family physician, which has been effective in ameliorating her symptoms. She has participated in both individual and family counseling as needed over the past five years. She continues to participate occasionally in a breast cancer support group. She also attends quarterly follow-up sessions with their family therapist. She denies a history of suicidal ideation or suicide attempts; however, she was hospitalized psychiatrically once in 2012 for severe depression, during which she was stabilized on medication and provided with coping skills training. There is no indication of chemical abuse or dependence. She denies a history of trauma. Eleanor Kaminski has never been diagnosed with an eating disorder, and her responses in the interview and on the Eating Habits Checklist do not warrant a clinical diagnosis. She does, however, acknowledge a tendency to overeat in response to emotional stress. She endorsed mildly self-punitive thoughts and feelings related to her weight and/or eating behaviors. She denies binge eating or purging behavior. 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  She currently resides with her  of 16 stretcher

## 2021-07-03 LAB
A/G RATIO: 1.9 (ref 1.1–2.2)
ALBUMIN SERPL-MCNC: 4.2 G/DL (ref 3.4–5)
ALP BLD-CCNC: 72 U/L (ref 40–129)
ALT SERPL-CCNC: 18 U/L (ref 10–40)
ANION GAP SERPL CALCULATED.3IONS-SCNC: 8 MMOL/L (ref 3–16)
AST SERPL-CCNC: 17 U/L (ref 15–37)
BILIRUB SERPL-MCNC: 0.6 MG/DL (ref 0–1)
BUN BLDV-MCNC: 13 MG/DL (ref 7–20)
CALCIUM SERPL-MCNC: 9.5 MG/DL (ref 8.3–10.6)
CHLORIDE BLD-SCNC: 104 MMOL/L (ref 99–110)
CHOLESTEROL, TOTAL: 173 MG/DL (ref 0–199)
CO2: 31 MMOL/L (ref 21–32)
CREAT SERPL-MCNC: 0.7 MG/DL (ref 0.6–1.1)
GFR AFRICAN AMERICAN: >60
GFR NON-AFRICAN AMERICAN: >60
GLOBULIN: 2.2 G/DL
GLUCOSE BLD-MCNC: 75 MG/DL (ref 70–99)
HDLC SERPL-MCNC: 71 MG/DL (ref 40–60)
LDL CHOLESTEROL CALCULATED: 91 MG/DL
POTASSIUM SERPL-SCNC: 4.9 MMOL/L (ref 3.5–5.1)
SODIUM BLD-SCNC: 143 MMOL/L (ref 136–145)
TOTAL PROTEIN: 6.4 G/DL (ref 6.4–8.2)
TRIGL SERPL-MCNC: 54 MG/DL (ref 0–150)
VITAMIN D 25-HYDROXY: 161.8 NG/ML
VLDLC SERPL CALC-MCNC: 11 MG/DL

## 2021-07-04 LAB
ESTIMATED AVERAGE GLUCOSE: 105.4 MG/DL
HBA1C MFR BLD: 5.3 %

## 2021-07-20 ENCOUNTER — NURSE TRIAGE (OUTPATIENT)
Dept: OTHER | Facility: CLINIC | Age: 51
End: 2021-07-20

## 2021-07-20 NOTE — TELEPHONE ENCOUNTER
Reason for Disposition   Can't take a deep breath but no respiratory distress    Answer Assessment - Initial Assessment Questions  1. MECHANISM: \"How did the injury happen? \"       Pt fell in hotel shower     2. ONSET: \"When did the injury happen? \" (Minutes or hours ago)     7/20/2021 one  Hour prior to triage      3. LOCATION: \"What part of the abdomen is injured? \"      \"Mid section - to right below the rib cage\"- more on the right side of the abdomen     4. APPEARANCE of INJURY: \"What does the injury look like? \"      No bruising at this time - unsure of swelling     5. PAIN: \"Is there any pain? \" If so, ask: \"How bad is the pain? \"  (e.g., Scale 1-10; or mild, moderate, severe)   - MILD - doesn't interfere with normal activities    - MODERATE - interferes with normal activities or awakens from sleep    - SEVERE - patient doesn't want to move (R/O peritonitis, internal bleeding)       \"uncomfortable\" 4/10    6. SIZE: For cuts, bruises, or swelling, ask: \"How large is it? \" (e.g., inches or centimeters)      Denies     7. TETANUS: For any breaks in the skin, ask: \"When was the last tetanus booster? \"      N/A     8. OTHER SYMPTOMS: \"Do you have any other symptoms? \"      Discomfort when I take a deep breath in the chest cavity - \"feels tight\" - nausea     9. PREGNANCY: \"Is there any chance you are pregnant? \" \"When was your last menstrual period? \"      N/A    Protocols used: ABDOMINAL INJURY-ADULT-[place]    . Brief description of triage: pain with inhalation after a fall in the shower. Pt sustained an abdominal injury. See assessment above. Triage indicates for patient to Go to ED now. Pt in agreement with this plan. Care advice provided, patient verbalizes understanding; denies any other questions or concerns; instructed to call back for any new or worsening symptoms. Attention Provider: Thank you for allowing me to participate in the care of your patient.   The patient was connected to triage in response to symptoms provided. Please do not respond through this encounter as the response is not directed to a shared pool.

## 2022-01-25 NOTE — ADDENDUM NOTE
Addended by: Pako Ramos on: 8/2/2020 04:08 PM     Modules accepted: Level of Service 25-Jan-2022 20:53

## 2024-03-26 NOTE — PROGRESS NOTES
Patient not reached. Preop instructions left on voice mail. Hswgzd__[phone]_______      DATE__5/28/19______ TIME__0830______ARRIVAL_FEC  0700________      Nothing to eat or drink after midnight the night before,except for what the prep instructions call for. If you do not have the instructions or do not understand them please contact your doctors office. Follow any instructions your doctors office has given you including what medications to take the AM of your procedure and which ones to hold. You may use your inhalers. If you take a long acting insulin the stoney prior please cut the dose in half and take no diabetic medications that AM.Follow specific doctors office instructions regarding blood thinners and if they want you to hold and for how long. If you are on a blood thinner and have no instructions please contact the office and ask. Dress comfortably,bring your insurance card,picture ID,and a complete list of medications, including supplements. You must have a responsible adult to stay with you during the procedure,drive you home and stay with you. [facility] phone number [phone] for any questions. This pharmacy faxed over request for the following prescriptions to be filled:    Medication requested : finasteride (PROSCAR) 5 MG tablet QTY 90 Refill 2  PCP: Kelly  [facility]  Mail order or Local pharmacy Mail Order     Scheduled appointment if not seen by current providers in office: LOV 1/9/2024 FU 4/9/2024

## (undated) DEVICE — BW-412T DISP COMBO CLEANING BRUSH: Brand: SINGLE USE COMBINATION CLEANING BRUSH

## (undated) DEVICE — PROCEDURE KIT ENDOSCP CUST

## (undated) DEVICE — Device: Brand: DISPOSABLE ELECTROSURGICAL SNARE

## (undated) DEVICE — TRAP SPEC RETRV CLR PLAS POLYP IN LN SUCT QUIK CTCH

## (undated) DEVICE — SOLUTION IV IRRIG WATER 500ML POUR BRL ST 2F7113

## (undated) DEVICE — SET VLV 3 PC AWS DISPOSABLE GRDIAN SCOPEVALET